# Patient Record
Sex: MALE | Race: BLACK OR AFRICAN AMERICAN | HISPANIC OR LATINO | Employment: FULL TIME | URBAN - METROPOLITAN AREA
[De-identification: names, ages, dates, MRNs, and addresses within clinical notes are randomized per-mention and may not be internally consistent; named-entity substitution may affect disease eponyms.]

---

## 2023-05-09 PROBLEM — R53.83 OTHER FATIGUE: Status: ACTIVE | Noted: 2023-05-09

## 2023-05-09 PROBLEM — Z00.00 ANNUAL PHYSICAL EXAM: Status: ACTIVE | Noted: 2023-05-09

## 2023-06-06 ENCOUNTER — ANESTHESIA EVENT (OUTPATIENT)
Dept: ANESTHESIOLOGY | Facility: HOSPITAL | Age: 48
End: 2023-06-06

## 2023-06-06 ENCOUNTER — ANESTHESIA (OUTPATIENT)
Dept: ANESTHESIOLOGY | Facility: HOSPITAL | Age: 48
End: 2023-06-06

## 2023-06-06 NOTE — ANESTHESIA PREPROCEDURE EVALUATION
Procedure:  PRE-OP ONLY    Relevant Problems   CARDIO   (+) CALDERON (dyspnea on exertion)      GI/HEPATIC   (+) Gastroesophageal reflux disease without esophagitis      /RENAL   (+) BREANN (acute kidney injury) ([facility] [address] )      PULMONARY   (+) CALDERON (dyspnea on exertion)        Physical Exam    Airway    Mallampati score: II  TM Distance: >3 FB  Neck ROM: full     Dental   Comment: Multiple caps,     Cardiovascular  Cardiovascular exam normal    Pulmonary  Pulmonary exam normal     Other Findings        Anesthesia Plan  ASA Score- 2     Anesthesia Type- IV sedation with anesthesia with ASA Monitors  Additional Monitors:   Airway Plan:           Plan Factors-Exercise tolerance (METS): >4 METS  Chart reviewed  EKG reviewed  Imaging results reviewed  Existing labs reviewed  Patient summary reviewed  Patient is not a current smoker  Patient not instructed to abstain from smoking on day of procedure  Patient did not smoke on day of surgery  Induction- intravenous  Postoperative Plan-     Informed Consent- Anesthetic plan and risks discussed with patient  I personally reviewed this patient with the CRNA  Discussed and agreed on the Anesthesia Plan with the CRNA  Lindsay Bullock

## 2023-06-07 ENCOUNTER — ANESTHESIA (OUTPATIENT)
Dept: GASTROENTEROLOGY | Facility: AMBULARY SURGERY CENTER | Age: 48
End: 2023-06-07

## 2023-06-07 ENCOUNTER — HOSPITAL ENCOUNTER (OUTPATIENT)
Dept: GASTROENTEROLOGY | Facility: AMBULARY SURGERY CENTER | Age: 48
Setting detail: OUTPATIENT SURGERY
Discharge: HOME | End: 2023-06-07
Attending: INTERNAL MEDICINE
Payer: COMMERCIAL

## 2023-06-07 ENCOUNTER — ANESTHESIA EVENT (OUTPATIENT)
Dept: GASTROENTEROLOGY | Facility: AMBULARY SURGERY CENTER | Age: 48
End: 2023-06-07

## 2023-06-07 VITALS
DIASTOLIC BLOOD PRESSURE: 58 MMHG | SYSTOLIC BLOOD PRESSURE: 115 MMHG | TEMPERATURE: 98.4 F | BODY MASS INDEX: 20.72 KG/M2 | OXYGEN SATURATION: 100 % | RESPIRATION RATE: 20 BRPM | HEART RATE: 63 BPM | WEIGHT: 148 LBS | HEIGHT: 71 IN

## 2023-06-07 DIAGNOSIS — Z12.11 SCREENING FOR COLON CANCER: ICD-10-CM

## 2023-06-07 PROCEDURE — 88305 TISSUE EXAM BY PATHOLOGIST: CPT | Performed by: SPECIALIST

## 2023-06-07 RX ORDER — SODIUM CHLORIDE, SODIUM LACTATE, POTASSIUM CHLORIDE, CALCIUM CHLORIDE 600; 310; 30; 20 MG/100ML; MG/100ML; MG/100ML; MG/100ML
INJECTION, SOLUTION INTRAVENOUS CONTINUOUS PRN
Status: DISCONTINUED | OUTPATIENT
Start: 2023-06-07 | End: 2023-06-07

## 2023-06-07 RX ORDER — PROPOFOL 10 MG/ML
INJECTION, EMULSION INTRAVENOUS AS NEEDED
Status: DISCONTINUED | OUTPATIENT
Start: 2023-06-07 | End: 2023-06-07

## 2023-06-07 RX ADMIN — PROPOFOL 50 MG: 10 INJECTION, EMULSION INTRAVENOUS at 10:05

## 2023-06-07 RX ADMIN — PROPOFOL 50 MG: 10 INJECTION, EMULSION INTRAVENOUS at 10:13

## 2023-06-07 RX ADMIN — PROPOFOL 50 MG: 10 INJECTION, EMULSION INTRAVENOUS at 10:19

## 2023-06-07 RX ADMIN — PROPOFOL 50 MG: 10 INJECTION, EMULSION INTRAVENOUS at 10:10

## 2023-06-07 RX ADMIN — PROPOFOL 150 MG: 10 INJECTION, EMULSION INTRAVENOUS at 10:02

## 2023-06-07 RX ADMIN — PROPOFOL 50 MG: 10 INJECTION, EMULSION INTRAVENOUS at 10:16

## 2023-06-07 RX ADMIN — SODIUM CHLORIDE, SODIUM LACTATE, POTASSIUM CHLORIDE, AND CALCIUM CHLORIDE: .6; .31; .03; .02 INJECTION, SOLUTION INTRAVENOUS at 09:54

## 2023-06-07 RX ADMIN — PROPOFOL 50 MG: 10 INJECTION, EMULSION INTRAVENOUS at 10:08

## 2023-06-07 RX ADMIN — Medication 40 MG: at 10:09

## 2023-06-07 NOTE — ANESTHESIA POSTPROCEDURE EVALUATION
Post-Op Assessment Note    CV Status:  Stable  Pain Score: 0    Pain management: adequate     Mental Status:  Arousable and sleepy   Hydration Status:  Stable   PONV Controlled:  Controlled   Airway Patency:  Patent      Post Op Vitals Reviewed: Yes      Staff: CRNA         No notable events documented      BP   94/63   Temp 97 6   Pulse 59   Resp 14   SpO2 99

## 2023-06-07 NOTE — H&P
"History and Physical -  [facility]  Dante Gordon 51 y o  male MRN: [id number]    HPI: Dante Gordon is a 51y o  year old male who presents for screening colonoscopy      Review of Systems    Historical Information   History reviewed  No pertinent past medical history    Past Surgical History:   Procedure Laterality Date   • FOOT SURGERY     • [place] CORRJ HALLUX VALGUS W/SESMDC W/2 OSTEOT Right 10/12/2022    Procedure: BUNIONECTOMY (KIM) ROSS OSTEOTOMY 2ND METATARSAL OSTEOTOMY, REPAIR 2ND DIGIT HAMMERTOE;  Surgeon: Jose Enrique Muhammad DPM;  Location:  MAIN OR;  Service: Podiatry     Social History   Social History     Substance and Sexual Activity   Alcohol Use Yes   • Alcohol/week: 2 0 standard drinks of alcohol   • Types: 2 Cans of beer per week    Comment: social     Social History     Substance and Sexual Activity   Drug Use Yes   • Frequency: 7 0 times per week   • Types: Marijuana    Comment: 8 blunts daily      Social History     Tobacco Use   Smoking Status Every Day   • Packs/day: 0 50   • Years: 35 00   • Total pack years: 17 50   • Types: Cigarettes   • Start date: 1985   Smokeless Tobacco Never     Family History   Problem Relation Age of Onset   • Hypertension Mother    • Mental illness Father    • Asthma Daughter    • Throat cancer Maternal Uncle    • Heart disease Maternal Grandmother        Meds/Allergies     (Not in a hospital admission)      No Known Allergies    Objective     /69   Pulse 63   Temp 97 9 °F (36 6 °C) (Temporal)   Resp 18   Ht 5' 11\" (1 803 m)   Wt 67 1 kg (148 lb)   SpO2 99%   BMI 20 64 kg/m²       PHYSICAL EXAM    Gen: NAD  CV: RRR  CHEST: Clear  ABD: soft, NT/ND  EXT: no edema  Neuro: AAO      ASSESSMENT/PLAN:  This is a 51y o  year old male here for screening colonoscopy    PLAN:   Procedure: Colonoscopy with biopsy and possible polypectomy    "

## 2023-06-07 NOTE — ANESTHESIA PREPROCEDURE EVALUATION
"Procedure:  COLONOSCOPY     ECHO (01/24/22): Interpretation Summary  •  Left Ventricle: Left ventricular cavity size is normal  The left ventricular ejection fraction is 60%  Systolic function is normal  Wall motion is normal  Diastolic function is normal   •  The left ventricular wall motion is normal     Relevant Problems   ANESTHESIA (within normal limits)      CARDIO   (+) CALDERON (dyspnea on exertion)      ENDO (within normal limits)      GI/HEPATIC   (+) Gastroesophageal reflux disease without esophagitis      /RENAL   (+) BREANN (acute kidney injury) ([facility] [address] )      GYN (within normal limits)      HEMATOLOGY (within normal limits)      MUSCULOSKELETAL (within normal limits)      NEURO/PSYCH (within normal limits)      PULMONARY   (+) CALDERON (dyspnea on exertion)      Other   (+) Tobacco dependence      Lab Results   Component Value Date    HCT 39 3 05/26/2023    HGB 12 8 05/26/2023    MCV 89 05/26/2023     05/26/2023    WBC 12 47 (H) 05/26/2023     Lab Results   Component Value Date    AGAP 5 05/26/2023    ALKPHOS 84 05/26/2023    ALT 11 05/26/2023    AST 19 05/26/2023    BUN 11 05/26/2023    CALCIUM 9 2 05/26/2023     05/26/2023    CO2 29 05/26/2023    CREATININE 1 10 05/26/2023    EGFR 78 05/26/2023    GLUC 96 10/05/2022    GLUF 48 (L) 05/26/2023    K 4 4 05/26/2023    SODIUM 139 05/26/2023    TBILI 0 59 05/26/2023    TP 7 4 05/26/2023     No results found for: \"PTT\"  No results found for: \"INR\", \"PROTIME\"    Physical Exam    Airway    Mallampati score: I  TM Distance: >3 FB  Neck ROM: full     Dental   No notable dental hx     Cardiovascular  Rhythm: regular, Rate: normal, Cardiovascular exam normal    Pulmonary  Pulmonary exam normal Breath sounds clear to auscultation,     Other Findings        Anesthesia Plan  ASA Score- 2     Anesthesia Type- IV sedation with anesthesia with ASA Monitors  Additional Monitors:   Airway Plan:           Plan Factors-Exercise tolerance (METS): >4 METS      Chart " reviewed  EKG reviewed  Imaging results reviewed  Existing labs reviewed  Patient summary reviewed  Patient is a current smoker  Patient did not smoke on day of surgery  Obstructive sleep apnea risk education given perioperatively  Induction- intravenous  Postoperative Plan-     Informed Consent- Anesthetic plan and risks discussed with patient  I personally reviewed this patient with the CRNA  Discussed and agreed on the Anesthesia Plan with the KEVAN Morrissey

## 2023-06-23 DIAGNOSIS — N52.9 ERECTILE DYSFUNCTION, UNSPECIFIED ERECTILE DYSFUNCTION TYPE: ICD-10-CM

## 2023-06-23 RX ORDER — SILDENAFIL 100 MG/1
100 TABLET, FILM COATED ORAL AS NEEDED
Qty: 30 TABLET | Refills: 2 | Status: SHIPPED | OUTPATIENT
Start: 2023-06-23

## 2023-07-08 PROBLEM — Z12.5 PROSTATE CANCER SCREENING: Status: RESOLVED | Noted: 2023-05-09 | Resolved: 2023-07-08

## 2023-09-11 DIAGNOSIS — N52.9 ERECTILE DYSFUNCTION, UNSPECIFIED ERECTILE DYSFUNCTION TYPE: ICD-10-CM

## 2023-09-11 RX ORDER — SILDENAFIL 100 MG/1
100 TABLET, FILM COATED ORAL AS NEEDED
Qty: 30 TABLET | Refills: 2 | OUTPATIENT
Start: 2023-09-11

## 2023-09-11 RX ORDER — SILDENAFIL 100 MG/1
100 TABLET, FILM COATED ORAL AS NEEDED
Qty: 30 TABLET | Refills: 0 | Status: SHIPPED | OUTPATIENT
Start: 2023-09-11

## 2023-10-05 DIAGNOSIS — N52.9 ERECTILE DYSFUNCTION, UNSPECIFIED ERECTILE DYSFUNCTION TYPE: ICD-10-CM

## 2023-10-09 RX ORDER — SILDENAFIL 100 MG/1
100 TABLET, FILM COATED ORAL AS NEEDED
Qty: 30 TABLET | Refills: 0 | Status: SHIPPED | OUTPATIENT
Start: 2023-10-09

## 2023-11-05 DIAGNOSIS — N52.9 ERECTILE DYSFUNCTION, UNSPECIFIED ERECTILE DYSFUNCTION TYPE: ICD-10-CM

## 2023-11-07 RX ORDER — SILDENAFIL 100 MG/1
TABLET, FILM COATED ORAL
Qty: 30 TABLET | Refills: 0 | Status: SHIPPED | OUTPATIENT
Start: 2023-11-07

## 2023-11-27 ENCOUNTER — OFFICE VISIT (OUTPATIENT)
Dept: FAMILY MEDICINE CLINIC | Facility: CLINIC | Age: 48
End: 2023-11-27
Payer: COMMERCIAL

## 2023-11-27 VITALS
HEART RATE: 66 BPM | RESPIRATION RATE: 15 BRPM | DIASTOLIC BLOOD PRESSURE: 84 MMHG | OXYGEN SATURATION: 100 % | TEMPERATURE: 96.9 F | WEIGHT: 154.8 LBS | HEIGHT: 71 IN | BODY MASS INDEX: 21.67 KG/M2 | SYSTOLIC BLOOD PRESSURE: 120 MMHG

## 2023-11-27 DIAGNOSIS — N52.9 ERECTILE DYSFUNCTION, UNSPECIFIED ERECTILE DYSFUNCTION TYPE: ICD-10-CM

## 2023-11-27 DIAGNOSIS — M25.461 PREPATELLAR EFFUSION OF RIGHT KNEE: Primary | ICD-10-CM

## 2023-11-27 PROCEDURE — 99213 OFFICE O/P EST LOW 20 MIN: CPT

## 2023-11-27 RX ORDER — SILDENAFIL 100 MG/1
100 TABLET, FILM COATED ORAL AS NEEDED
Qty: 30 TABLET | Refills: 0 | Status: SHIPPED | OUTPATIENT
Start: 2023-11-27

## 2023-11-27 NOTE — ASSESSMENT & PLAN NOTE
Focal swelling lying anterior R patella with moderate tenderness. No known trauma. No fevers or rashes. No warmth or redness to knee.  Previously has occurred to L knee which required drainage.  - Trial OTC naproxen, 1 tablet taken BID, assess effectiveness of antiinflammatory  - Referral to sports medicine for local imaging and consideration for drainage

## 2023-11-27 NOTE — ASSESSMENT & PLAN NOTE
Medication refill. Pt denies hx palpitations, chest pain, AM headaches, or priapism. Takes 1 100mg tablet only.

## 2023-11-27 NOTE — PROGRESS NOTES
Name: Neeru Andrade      : 1975      MRN: [id number]  Encounter Provider: Dorian Ruiz MD  Encounter Date: 2023   Encounter department: [facility]    Assessment & Plan     1. Prepatellar effusion of right knee  Assessment & Plan:  Focal swelling lying anterior R patella with moderate tenderness. No known trauma. No fevers or rashes. No warmth or redness to knee. Previously has occurred to L knee which required drainage.  - Trial OTC naproxen, 1 tablet taken BID, assess effectiveness of antiinflammatory  - Referral to sports medicine for local imaging and consideration for drainage    Orders:  -     Ambulatory Referral to Sports Medicine; Future    2. Erectile dysfunction, unspecified erectile dysfunction type  Assessment & Plan:  Medication refill. Pt denies hx palpitations, chest pain, AM headaches, or priapism. Takes 1 100mg tablet only. Orders:  -     sildenafil (VIAGRA) 100 mg tablet; Take 1 tablet (100 mg total) by mouth as needed for erectile dysfunction           Subjective      HPI  Pt presents with R knee swelling. Hx L knee effusion requiring drainage at the hospital, did not need abx afterward. No fluid analysis available in chart. R knee swollen x1w. No notable injuries. On feet a lot at work. Moderately painful and significantly swollen. Hurts more when ambulating, relieved by laying down. Diet without much red meat, without much beer. Fhx: grandmother had osteoarthritis  No rashes, no fevers, no discomfort in other joints. Review of Systems   Constitutional:  Negative for chills and fever. HENT:  Negative for ear pain and sore throat. Eyes:  Negative for pain and visual disturbance. Respiratory:  Negative for cough and shortness of breath. Cardiovascular:  Negative for chest pain and palpitations. Gastrointestinal:  Negative for abdominal pain and vomiting. Genitourinary:  Negative for dysuria and hematuria. Musculoskeletal:  Positive for joint swelling. Negative for back pain. Skin:  Negative for color change and rash. Neurological:  Negative for seizures and syncope. Current Outpatient Medications on File Prior to Visit   Medication Sig    gabapentin (NEURONTIN) 100 mg capsule     [DISCONTINUED] sildenafil (VIAGRA) 100 mg tablet TAKE 1 TABLET BY MOUTH AS NEEDED FOR ERECTILE DYSFUNCTION FOR UP TO 10 DOSES    buPROPion (WELLBUTRIN XL) 150 mg 24 hr tablet Take 1 tablet (150 mg total) by mouth every morning    ibuprofen (MOTRIN) 600 mg tablet  (Patient not taking: Reported on 5/9/2023)    nicotine (NICODERM CQ) 14 mg/24hr TD 24 hr patch Place 1 patch on the skin every 24 hours (Patient not taking: Reported on 11/27/2023)       Objective     /84 (BP Location: Right arm, Patient Position: Sitting, Cuff Size: Adult)   Pulse 66   Temp (!) 96.9 °F (36.1 °C) (Tympanic)   Resp 15   Ht 5' 11" (1.803 m)   Wt 70.2 kg (154 lb 12.8 oz)   SpO2 100%   BMI 21.59 kg/m²     Physical Exam  Constitutional:       General: He is not in acute distress. HENT:      Head: Normocephalic and atraumatic. Nose: Nose normal. No congestion or rhinorrhea. Mouth/Throat:      Mouth: Mucous membranes are moist.      Pharynx: No oropharyngeal exudate or posterior oropharyngeal erythema. Eyes:      General:         Right eye: No discharge. Left eye: No discharge. Conjunctiva/sclera: Conjunctivae normal.   Cardiovascular:      Rate and Rhythm: Normal rate and regular rhythm. Pulmonary:      Effort: Pulmonary effort is normal. No respiratory distress. Abdominal:      General: Abdomen is flat. There is no distension. Musculoskeletal:         General: Swelling (focal area lying anterior R patella, no notable edema to knee joint) and tenderness (overlying area of edema) present. No signs of injury. Cervical back: Neck supple. Right lower leg: No edema. Left lower leg: No edema.       Comments: Full strength and ROM of R knee. No joint line tenderness, no crepitus, no overlying erythema or warmth   Lymphadenopathy:      Cervical: No cervical adenopathy. Skin:     General: Skin is dry. Coloration: Skin is not jaundiced. Neurological:      General: No focal deficit present. Mental Status: He is alert and oriented to person, place, and time. Psychiatric:         Mood and Affect: Mood normal.         Thought Content:  Thought content normal.       Ann Norton MD

## 2023-11-28 ENCOUNTER — TELEPHONE (OUTPATIENT)
Age: 48
End: 2023-11-28

## 2023-11-28 NOTE — TELEPHONE ENCOUNTER
Caller: Patient     Doctor: Pelon Yusuf    Reason for call: Patient called to schedule from referral for Dr. Pelon Yusuf, but was on break and had to get back to work. Patient stated he will call back later.     Call back#: n/a

## 2023-12-28 DIAGNOSIS — N52.9 ERECTILE DYSFUNCTION, UNSPECIFIED ERECTILE DYSFUNCTION TYPE: ICD-10-CM

## 2023-12-28 RX ORDER — SILDENAFIL 100 MG/1
100 TABLET, FILM COATED ORAL AS NEEDED
Qty: 30 TABLET | Refills: 0 | Status: SHIPPED | OUTPATIENT
Start: 2023-12-28

## 2024-02-12 DIAGNOSIS — N52.9 ERECTILE DYSFUNCTION, UNSPECIFIED ERECTILE DYSFUNCTION TYPE: ICD-10-CM

## 2024-02-12 RX ORDER — SILDENAFIL 100 MG/1
100 TABLET, FILM COATED ORAL AS NEEDED
Qty: 30 TABLET | Refills: 0 | Status: SHIPPED | OUTPATIENT
Start: 2024-02-12

## 2024-02-26 DIAGNOSIS — N52.9 ERECTILE DYSFUNCTION, UNSPECIFIED ERECTILE DYSFUNCTION TYPE: ICD-10-CM

## 2024-02-27 RX ORDER — SILDENAFIL 100 MG/1
100 TABLET, FILM COATED ORAL AS NEEDED
Qty: 30 TABLET | Refills: 0 | Status: SHIPPED | OUTPATIENT
Start: 2024-02-27

## 2024-03-25 DIAGNOSIS — N52.9 ERECTILE DYSFUNCTION, UNSPECIFIED ERECTILE DYSFUNCTION TYPE: ICD-10-CM

## 2024-03-28 RX ORDER — SILDENAFIL 100 MG/1
100 TABLET, FILM COATED ORAL AS NEEDED
Qty: 30 TABLET | Refills: 0 | Status: SHIPPED | OUTPATIENT
Start: 2024-03-28

## 2024-04-15 DIAGNOSIS — N52.9 ERECTILE DYSFUNCTION, UNSPECIFIED ERECTILE DYSFUNCTION TYPE: ICD-10-CM

## 2024-04-15 RX ORDER — SILDENAFIL 100 MG/1
100 TABLET, FILM COATED ORAL AS NEEDED
Qty: 30 TABLET | Refills: 0 | Status: SHIPPED | OUTPATIENT
Start: 2024-04-15

## 2024-05-02 DIAGNOSIS — N52.9 ERECTILE DYSFUNCTION, UNSPECIFIED ERECTILE DYSFUNCTION TYPE: ICD-10-CM

## 2024-05-03 RX ORDER — SILDENAFIL 100 MG/1
100 TABLET, FILM COATED ORAL AS NEEDED
Qty: 30 TABLET | Refills: 5 | Status: SHIPPED | OUTPATIENT
Start: 2024-05-03

## 2024-05-20 DIAGNOSIS — N52.9 ERECTILE DYSFUNCTION, UNSPECIFIED ERECTILE DYSFUNCTION TYPE: ICD-10-CM

## 2024-05-20 RX ORDER — SILDENAFIL 100 MG/1
100 TABLET, FILM COATED ORAL AS NEEDED
Qty: 30 TABLET | Refills: 5 | Status: SHIPPED | OUTPATIENT
Start: 2024-05-20

## 2024-06-03 DIAGNOSIS — N52.9 ERECTILE DYSFUNCTION, UNSPECIFIED ERECTILE DYSFUNCTION TYPE: ICD-10-CM

## 2024-06-03 RX ORDER — SILDENAFIL 100 MG/1
100 TABLET, FILM COATED ORAL AS NEEDED
Qty: 30 TABLET | Refills: 0 | Status: SHIPPED | OUTPATIENT
Start: 2024-06-03

## 2024-06-16 ENCOUNTER — HOSPITAL ENCOUNTER (EMERGENCY)
Facility: HOSPITAL | Age: 49
Discharge: HOME | End: 2024-06-16
Attending: EMERGENCY MEDICINE | Admitting: EMERGENCY MEDICINE
Payer: COMMERCIAL

## 2024-06-16 VITALS
TEMPERATURE: 98.1 F | OXYGEN SATURATION: 98 % | RESPIRATION RATE: 18 BRPM | HEART RATE: 78 BPM | SYSTOLIC BLOOD PRESSURE: 148 MMHG | DIASTOLIC BLOOD PRESSURE: 84 MMHG

## 2024-06-16 DIAGNOSIS — M54.50 ACUTE BILATERAL LOW BACK PAIN WITHOUT SCIATICA: Primary | ICD-10-CM

## 2024-06-16 LAB
BACTERIA UR QL AUTO: ABNORMAL /HPF
BILIRUB UR QL STRIP: NEGATIVE
CLARITY UR: CLEAR
COLOR UR: YELLOW
GLUCOSE UR STRIP-MCNC: NEGATIVE MG/DL
HGB UR QL STRIP.AUTO: NEGATIVE
KETONES UR STRIP-MCNC: NEGATIVE MG/DL
LEUKOCYTE ESTERASE UR QL STRIP: ABNORMAL
MUCOUS THREADS UR QL AUTO: ABNORMAL
NITRITE UR QL STRIP: NEGATIVE
NON-SQ EPI CELLS URNS QL MICRO: ABNORMAL /HPF
PH UR STRIP.AUTO: 6.5 [PH]
PROT UR STRIP-MCNC: NEGATIVE MG/DL
RBC #/AREA URNS AUTO: ABNORMAL /HPF
SP GR UR STRIP.AUTO: 1.02 (ref 1–1.03)
UROBILINOGEN UR QL STRIP.AUTO: 0.2 E.U./DL
WBC #/AREA URNS AUTO: ABNORMAL /HPF

## 2024-06-16 PROCEDURE — 81001 URINALYSIS AUTO W/SCOPE: CPT | Performed by: EMERGENCY MEDICINE

## 2024-06-16 PROCEDURE — 99283 EMERGENCY DEPT VISIT LOW MDM: CPT

## 2024-06-16 PROCEDURE — 96372 THER/PROPH/DIAG INJ SC/IM: CPT

## 2024-06-16 PROCEDURE — 99284 EMERGENCY DEPT VISIT MOD MDM: CPT | Performed by: EMERGENCY MEDICINE

## 2024-06-16 RX ORDER — IBUPROFEN 600 MG/1
600 TABLET ORAL EVERY 6 HOURS PRN
Qty: 30 TABLET | Refills: 0 | Status: SHIPPED | OUTPATIENT
Start: 2024-06-16

## 2024-06-16 RX ORDER — KETOROLAC TROMETHAMINE 30 MG/ML
30 INJECTION, SOLUTION INTRAMUSCULAR; INTRAVENOUS ONCE
Status: COMPLETED | OUTPATIENT
Start: 2024-06-16 | End: 2024-06-16

## 2024-06-16 RX ORDER — METHOCARBAMOL 500 MG/1
500 TABLET, FILM COATED ORAL 2 TIMES DAILY
Qty: 20 TABLET | Refills: 0 | Status: SHIPPED | OUTPATIENT
Start: 2024-06-16

## 2024-06-16 RX ORDER — METHOCARBAMOL 500 MG/1
500 TABLET, FILM COATED ORAL ONCE
Status: COMPLETED | OUTPATIENT
Start: 2024-06-16 | End: 2024-06-16

## 2024-06-16 RX ORDER — LIDOCAINE 50 MG/G
1 PATCH TOPICAL ONCE
Status: DISCONTINUED | OUTPATIENT
Start: 2024-06-16 | End: 2024-06-16 | Stop reason: HOSPADM

## 2024-06-16 RX ADMIN — LIDOCAINE 1 PATCH: 700 PATCH TOPICAL at 09:44

## 2024-06-16 RX ADMIN — METHOCARBAMOL 500 MG: 500 TABLET ORAL at 09:44

## 2024-06-16 RX ADMIN — KETOROLAC TROMETHAMINE 30 MG: 30 INJECTION, SOLUTION INTRAMUSCULAR at 09:44

## 2024-06-16 NOTE — Clinical Note
Dimitri Patel was seen and treated in our emergency department on 6/16/2024.    No restrictions            Diagnosis: Back pain    Dimitri  may return to work on return date.    He may return on this date: 06/18/2024         If you have any questions or concerns, please don't hesitate to call.      Kar Birmingham MD    ______________________________           _______________          _______________  Hospital Representative                              Date                                Time

## 2024-06-16 NOTE — ED PROVIDER NOTES
History  Chief Complaint   Patient presents with    Back Pain     Pt reports midline back pain radiating to lower back x 2 days, pt lifts heavy objects for work but denies known injury/trauma to area. No meds PTA     49-year-old male presents to the emergency department for evaluation of back pain.  Patient reports worsening back pain over the past 2 weeks.  He states that he has to lift heavy objects at work and believes his pain is related to this.  Denies any recent falls or direct trauma to the area.  States pain is worse with movement and with bending.  He has not been taking any medications to treat his symptoms. Denies fevers, chills, sweats. No saddle anesthesia. Full strength and sensation bilateral lower extremities. No loss of bowel or bladder function. Denies IV drug use. Denies history of cancer.  No unexpected weight loss. No long term steroid use. No pulsatile abdominal mass. No hematuria. No HIV, Transplant or systemic corticosteroids. No midline tenderness.             Prior to Admission Medications   Prescriptions Last Dose Informant Patient Reported? Taking?   buPROPion (WELLBUTRIN XL) 150 mg 24 hr tablet   No No   Sig: Take 1 tablet (150 mg total) by mouth every morning   gabapentin (NEURONTIN) 100 mg capsule   Yes No   ibuprofen (MOTRIN) 600 mg tablet   Yes No   Patient not taking: Reported on 5/9/2023   nicotine (NICODERM CQ) 14 mg/24hr TD 24 hr patch   No No   Sig: Place 1 patch on the skin every 24 hours   Patient not taking: Reported on 11/27/2023   sildenafil (VIAGRA) 100 mg tablet   No No   Sig: Take 1 tablet (100 mg total) by mouth as needed for erectile dysfunction      Facility-Administered Medications: None       History reviewed. No pertinent past medical history.    Past Surgical History:   Procedure Laterality Date    FOOT SURGERY      [place] CORRJ HLX VLGS BNCTY [facility] W/DOUBLE OSTEOTOMY Right 10/12/2022    Procedure: BUNIONECTOMY (KIM) ROSS OSTEOTOMY 2ND METATARSAL OSTEOTOMY,  REPAIR 2ND DIGIT HAMMERTOE;  Surgeon: Nikolay Max DPM;  Location:  MAIN OR;  Service: Podiatry       Family History   Problem Relation Age of Onset    Hypertension Mother     Mental illness Father     Asthma Daughter     Throat cancer Maternal Uncle     Heart disease Maternal Grandmother      I have reviewed and agree with the history as documented.    E-Cigarette/Vaping    E-Cigarette Use Never User      E-Cigarette/Vaping Substances    Nicotine No     THC No     CBD No     Flavoring No     Other No     Unknown No      Social History     Tobacco Use    Smoking status: Every Day     Current packs/day: 0.50     Average packs/day: 0.5 packs/day for 39.5 years (19.7 ttl pk-yrs)     Types: Cigarettes     Start date: 1985    Smokeless tobacco: Never   Vaping Use    Vaping status: Never Used   Substance Use Topics    Alcohol use: Yes     Alcohol/week: 2.0 standard drinks of alcohol     Types: 2 Cans of beer per week     Comment: social    Drug use: Yes     Frequency: 7.0 times per week     Types: Marijuana     Comment: 8 blunts daily        Review of Systems   Constitutional:  Negative for chills and fever.   HENT:  Negative for ear pain and sore throat.    Eyes:  Negative for pain and visual disturbance.   Respiratory:  Negative for cough and shortness of breath.    Cardiovascular:  Negative for chest pain and palpitations.   Gastrointestinal:  Negative for abdominal pain and vomiting.   Genitourinary:  Negative for dysuria and hematuria.   Musculoskeletal:  Positive for back pain. Negative for arthralgias.   Skin:  Negative for color change and rash.   Neurological:  Negative for seizures and syncope.   All other systems reviewed and are negative.      Physical Exam  Physical Exam  Vitals and nursing note reviewed.   Constitutional:       General: He is not in acute distress.     Appearance: He is well-developed.   HENT:      Head: Normocephalic and atraumatic.   Eyes:      Conjunctiva/sclera: Conjunctivae normal.    Cardiovascular:      Rate and Rhythm: Normal rate and regular rhythm.      Pulses:           Dorsalis pedis pulses are 2+ on the right side and 2+ on the left side.      Heart sounds: No murmur heard.  Pulmonary:      Effort: Pulmonary effort is normal. No respiratory distress.      Breath sounds: Normal breath sounds.   Abdominal:      Palpations: Abdomen is soft.      Tenderness: There is no abdominal tenderness.   Musculoskeletal:         General: Tenderness present. No swelling.      Cervical back: Normal and neck supple.      Thoracic back: Normal.      Lumbar back: Spasms and tenderness present. No bony tenderness. Negative right straight leg raise test and negative left straight leg raise test.        Back:       Right lower leg: No edema.      Left lower leg: No edema.      Comments: Distal lower extremity pulse, motor and sensation intact and symmetric bilaterally   Skin:     General: Skin is warm and dry.      Capillary Refill: Capillary refill takes less than 2 seconds.   Neurological:      Mental Status: He is alert.      Sensory: Sensation is intact.      Motor: Motor function is intact.      Gait: Gait is intact.   Psychiatric:         Mood and Affect: Mood normal.         Vital Signs  ED Triage Vitals   Temperature Pulse Respirations Blood Pressure SpO2   06/16/24 0932 06/16/24 0932 06/16/24 0932 06/16/24 0932 06/16/24 0932   98.1 °F (36.7 °C) 78 18 148/84 98 %      Temp Source Heart Rate Source Patient Position - Orthostatic VS BP Location FiO2 (%)   06/16/24 0932 06/16/24 0932 06/16/24 0932 06/16/24 0932 --   Oral Monitor Sitting Left arm       Pain Score       06/16/24 0944       7           Vitals:    06/16/24 0932   BP: 148/84   Pulse: 78   Patient Position - Orthostatic VS: Sitting         Visual Acuity      ED Medications  Medications   ketorolac (TORADOL) injection 30 mg (30 mg Intramuscular Given 6/16/24 0944)   methocarbamol (ROBAXIN) tablet 500 mg (500 mg Oral Given 6/16/24 0944)        Diagnostic Studies  Results Reviewed       Procedure Component Value Units Date/Time    Urine Microscopic [[id number]]  (Abnormal) Collected: 06/16/24 1036    Lab Status: Final result Specimen: Urine, Clean Catch Updated: 06/16/24 1138     RBC, UA None Seen /hpf      WBC, UA 0-5 /hpf      Epithelial Cells None Seen /hpf      Bacteria, UA Occasional /hpf      MUCUS THREADS Moderate    UA (URINE) with reflex to Scope [[id number]]  (Abnormal) Collected: 06/16/24 1036    Lab Status: Final result Specimen: Urine, Clean Catch Updated: 06/16/24 1043     Color, UA Yellow     Clarity, UA Clear     Specific Gravity, UA 1.020     pH, UA 6.5     Leukocytes, UA Trace     Nitrite, UA Negative     Protein, UA Negative mg/dl      Glucose, UA Negative mg/dl      Ketones, UA Negative mg/dl      Urobilinogen, UA 0.2 E.U./dl      Bilirubin, UA Negative     Occult Blood, UA Negative                   No orders to display              Procedures  Procedures         ED Course  ED Course as of 06/16/24 1543   Sun Jun 16, 2024   1115 Patient reevaluated.  Reports significant improvement of symptoms.                   Medical Decision Making  49-year-old male presented to the emergency department for evaluation of back pain.  On arrival patient was awake, alert and in no acute distress.  Initial vital signs unremarkable.  Physical exam consistent with back spasm.  No red flag signs were present.  Patient treated symptomatically with Toradol, Robaxin and a Lidoderm patch.  On reevaluation patient reported improvement of symptoms.  Urinalysis was not consistent with a urinary tract infection.  No RBCs.  Low suspicion for a kidney stone.  All diagnostic studies were discussed with the patient in detail.  He is appropriate for discharge.  Recommendation was made for the patient to follow-up with his PCP and with comprehensive spine for continued symptoms.  Patient was provided with a prescription for Motrin and Robaxin.  Return precautions  were discussed.    Patient agrees with the plan for discharge and feels comfortable to go home with proper f/u. Advised to return for worsening or additional problems. Diagnostic tests were reviewed and questions answered. Diagnosis, care plan and treatment options were discussed. The patient understands instructions and will follow up as directed.        Amount and/or Complexity of Data Reviewed  Labs: ordered.    Risk  Prescription drug management.             Disposition  Final diagnoses:   Acute bilateral low back pain without sciatica     Time reflects when diagnosis was documented in both MDM as applicable and the Disposition within this note       Time User Action Codes Description Comment    6/16/2024 11:16 AM Kar Birmingham Add [M54.50] Acute bilateral low back pain without sciatica           ED Disposition       ED Disposition   Discharge    Condition   Stable    Date/Time   Sun Jun 16, 2024 10:54 AM    Comment   Dimitri Patel discharge to home/self care.                   Follow-up Information    None         Discharge Medication List as of 6/16/2024 11:18 AM        START taking these medications    Details   methocarbamol (ROBAXIN) 500 mg tablet Take 1 tablet (500 mg total) by mouth 2 (two) times a day, Starting Sun 6/16/2024, Normal           CONTINUE these medications which have CHANGED    Details   ibuprofen (MOTRIN) 600 mg tablet Take 1 tablet (600 mg total) by mouth every 6 (six) hours as needed for mild pain or moderate pain, Starting Sun 6/16/2024, Normal           CONTINUE these medications which have NOT CHANGED    Details   buPROPion (WELLBUTRIN XL) 150 mg 24 hr tablet Take 1 tablet (150 mg total) by mouth every morning, Starting Tue 5/9/2023, Until Sun 11/5/2023, Normal      gabapentin (NEURONTIN) 100 mg capsule Starting Mon 11/7/2022, Historical Med      nicotine (NICODERM CQ) 14 mg/24hr TD 24 hr patch Place 1 patch on the skin every 24 hours, Starting Mon 10/3/2022, Normal       sildenafil (VIAGRA) 100 mg tablet Take 1 tablet (100 mg total) by mouth as needed for erectile dysfunction, Starting Mon 6/3/2024, Normal                 PDMP Review       None            ED Provider  Electronically Signed by             Kar Birmingham MD  06/16/24 3874

## 2024-06-16 NOTE — ED NOTES
Patient unable to provide urine sample at this time. Provided water and encouraged to ring call bell when he is able to provide a sample.      Madiha Patel RN  06/16/24 5428

## 2024-06-17 ENCOUNTER — NURSE TRIAGE (OUTPATIENT)
Dept: PHYSICAL THERAPY | Facility: OTHER | Age: 49
End: 2024-06-17

## 2024-06-17 ENCOUNTER — TELEPHONE (OUTPATIENT)
Age: 49
End: 2024-06-17

## 2024-06-17 DIAGNOSIS — M54.50 ACUTE BILATERAL LOW BACK PAIN WITHOUT SCIATICA: Primary | ICD-10-CM

## 2024-06-17 NOTE — TELEPHONE ENCOUNTER
Additional Information   Negative: Has the patient had unexplained weight loss?   Negative: Does the patient have a fever?   Negative: Is the patient experiencing urine retention?   Negative: Is the patient experiencing acute drop foot or paralysis?   Negative: Has the patient experienced major trauma? (fall from height, high speed collision, direct blow to spine) and is also experiencing nausea, light-headedness, or loss of consciousness?   Negative: Is the patient experiencing blood in sputum?   Negative: Is this a chronic condition?    Protocols used: [facility] Protocol    [facility] was reviewed in detail and what we can provide for their back pain.  Patient is agreeable to being triaged and would like to proceed with Physical Therapy.    Referral was placed for Physical Therapy at the [facility] site. Patients information was sent to the  to make evaluation appointment. Patient made aware that the PT office  will be calling to schedule the appointment.  Patient was provided with the phone number to the PT office.    No further questions and/or concerns were voiced by the patient at this time. Patient states understanding of the referral that was placed.    Referral Closed.

## 2024-06-17 NOTE — TELEPHONE ENCOUNTER
"Additional Information   Negative: Is this related to a work injury?   Negative: Is this related to an MVA?   Negative: Are you currently recieving homecare services?    Background - Initial Assessment  Clinical complaint: ED visit yesterday 06/16 due to Midline Back Pain that started about \"2 weeks ago\", radiating into the lower back, top of both shoulders and trapezius muscle. No pain radiating to his hips or legs. No numbness or tingling. NKI (no w/c or auto) no falls. Patient states he lifts a lot of heavy objects at work that could contribute to his back pain. Patient states pain has been constant for the last 2 weeks. Worse when laying down. Patient described pain as aching, burning, sharp, stabbing, throbbing, dull, shooting.  Date of onset: 2 weeks ago  Frequency of pain: constant  Quality of pain: aching, burning, dull, sharp, shooting, stabbing, and throbbing.    Protocols used: [facility] Protocol    "

## 2024-06-27 DIAGNOSIS — N52.9 ERECTILE DYSFUNCTION, UNSPECIFIED ERECTILE DYSFUNCTION TYPE: ICD-10-CM

## 2024-06-28 RX ORDER — SILDENAFIL 100 MG/1
100 TABLET, FILM COATED ORAL DAILY PRN
Qty: 30 TABLET | Refills: 2 | Status: SHIPPED | OUTPATIENT
Start: 2024-06-28

## 2024-08-09 ENCOUNTER — OFFICE VISIT (OUTPATIENT)
Dept: FAMILY MEDICINE CLINIC | Facility: CLINIC | Age: 49
End: 2024-08-09

## 2024-08-09 VITALS
OXYGEN SATURATION: 97 % | SYSTOLIC BLOOD PRESSURE: 119 MMHG | DIASTOLIC BLOOD PRESSURE: 75 MMHG | TEMPERATURE: 98 F | HEART RATE: 66 BPM | WEIGHT: 151 LBS | HEIGHT: 71 IN | BODY MASS INDEX: 21.14 KG/M2

## 2024-08-09 DIAGNOSIS — M62.838 TRAPEZIUS MUSCLE SPASM: ICD-10-CM

## 2024-08-09 RX ORDER — METHOCARBAMOL 500 MG/1
500 TABLET, FILM COATED ORAL 2 TIMES DAILY
Qty: 20 TABLET | Refills: 0 | Status: SHIPPED | OUTPATIENT
Start: 2024-08-09

## 2024-08-09 NOTE — PROGRESS NOTES
"Ambulatory Visit  Name: Dimitri Patel      : 1975      MRN: [id number]  Encounter Provider: Armando Canseco DO  Encounter Date: 2024   Encounter department: [facility]    Assessment & Plan   1. Trapezius muscle spasm  Assessment & Plan:  Patient's point of pain on examination is consistent with distal lateral portion of trapezius musculature bilaterally more prominent on patient's left.  With no mechanism of acute injury and location of the pain we will proceed with  .  Conservative management.  He will trial 600 mg ibuprofen as needed as well as topical Voltaren gel.  Patient would like to undergo trial of OMT therapy at this time.  Patient explained what OMT is and is willing to give it a try.  Patient is also offered physical therapy which she will hold off on at this time and attempting OMT.  Will follow-up with patient in 1 month after conservative management with OMT and stretching.  If symptoms or not improve can consider imaging pending location and pain.     If pain becomes more diffuse with stiffness can consider rheumatologic workup at this time.  Orders:  -     Diclofenac Sodium (VOLTAREN) 1 %; Apply 4 g topically 4 (four) times a day  -     methocarbamol (ROBAXIN) 500 mg tablet; Take 1 tablet (500 mg total) by mouth 2 (two) times a day      Depression Screening and Follow-up Plan: Patient was screened for depression during today's encounter. They screened negative with a PHQ-2 score of 0.    Tobacco Cessation Counseling: Tobacco cessation counseling was not provided. The patient is sincerely urged to quit consumption of tobacco. He is not ready to quit tobacco.       History of Present Illness     Present with chronic \"back pain and shoulder\" that been ongoing for many months it has gotten progressively worse.  Patient denies any traumatic event or injury.  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  He also " states sometimes upon awaking from sleep he feels more pain as well.  Patient describes the pain as a tightness and stiffness and he points to the posterior side of his shoulder and his neck on his left and right side.  Patient denies any numbness or tingling in his arms.  Patient denies any weakness.  Patient denies any difficulty with  strength.  Patient also denies any anesthesias, bladder incontinence.  Patient tried IcyHot gel which showed moderate improvement.        Review of Systems   Constitutional:  Negative for fatigue.   Respiratory:  Negative for chest tightness and shortness of breath.    Cardiovascular:  Negative for chest pain and palpitations.   Gastrointestinal:  Negative for abdominal pain, blood in stool, diarrhea, nausea and vomiting.   Musculoskeletal:  Positive for back pain and neck pain.   Neurological:  Negative for dizziness, syncope, numbness and headaches.   Psychiatric/Behavioral:  Negative for sleep disturbance and suicidal ideas. The patient is not nervous/anxious.      Pertinent Medical History   Medical History Reviewed by provider this encounter:       Past Medical History   No past medical history on file.  Past Surgical History:   Procedure Laterality Date    FOOT SURGERY      NC CORRJ HLX VLGS BNCTY SESMDC W/DOUBLE OSTEOTOMY Right 10/12/2022    Procedure: BUNIONECTOMY (KIM) ROSS OSTEOTOMY 2ND METATARSAL OSTEOTOMY, REPAIR 2ND DIGIT HAMMERTOE;  Surgeon: Nikolay Max DPM;  Location:  MAIN OR;  Service: Podiatry     Family History   Problem Relation Age of Onset    Hypertension Mother     Mental illness Father     Asthma Daughter     Throat cancer Maternal Uncle     Heart disease Maternal Grandmother      Current Outpatient Medications on File Prior to Visit   Medication Sig Dispense Refill    gabapentin (NEURONTIN) 100 mg capsule       ibuprofen (MOTRIN) 600 mg tablet Take 1 tablet (600 mg total) by mouth every 6 (six) hours as needed for mild pain or moderate pain 30  "tablet 0    sildenafil (VIAGRA) 100 mg tablet TAKE 1 TABLET BY MOUTH AS NEEDED FOR ERECTILE DYSFUNCTION 30 tablet 2    buPROPion (WELLBUTRIN XL) 150 mg 24 hr tablet Take 1 tablet (150 mg total) by mouth every morning 30 tablet 5    nicotine (NICODERM CQ) 14 mg/24hr TD 24 hr patch Place 1 patch on the skin every 24 hours (Patient not taking: Reported on 11/27/2023) 28 patch 0     No current facility-administered medications on file prior to visit.   No Known Allergies   Current Outpatient Medications on File Prior to Visit   Medication Sig Dispense Refill    gabapentin (NEURONTIN) 100 mg capsule       ibuprofen (MOTRIN) 600 mg tablet Take 1 tablet (600 mg total) by mouth every 6 (six) hours as needed for mild pain or moderate pain 30 tablet 0    sildenafil (VIAGRA) 100 mg tablet TAKE 1 TABLET BY MOUTH AS NEEDED FOR ERECTILE DYSFUNCTION 30 tablet 2    buPROPion (WELLBUTRIN XL) 150 mg 24 hr tablet Take 1 tablet (150 mg total) by mouth every morning 30 tablet 5    nicotine (NICODERM CQ) 14 mg/24hr TD 24 hr patch Place 1 patch on the skin every 24 hours (Patient not taking: Reported on 11/27/2023) 28 patch 0     No current facility-administered medications on file prior to visit.      Social History     Tobacco Use    Smoking status: Every Day     Current packs/day: 0.50     Average packs/day: 0.5 packs/day for 39.6 years (19.8 ttl pk-yrs)     Types: Cigarettes     Start date: 1985    Smokeless tobacco: Never   Vaping Use    Vaping status: Never Used   Substance and Sexual Activity    Alcohol use: Yes     Alcohol/week: 2.0 standard drinks of alcohol     Types: 2 Cans of beer per week     Comment: social    Drug use: Yes     Frequency: 7.0 times per week     Types: Marijuana     Comment: 8 blunts daily     Sexual activity: Yes     Partners: Female     Objective     /75 (BP Location: Left arm, Patient Position: Sitting, Cuff Size: Standard)   Pulse 66   Temp 98 °F (36.7 °C) (Tympanic)   Ht 5' 11\" (1.803 m)   Wt " 68.5 kg (151 lb)   SpO2 97%   BMI 21.06 kg/m²     Physical Exam  Constitutional:       Appearance: Normal appearance.   HENT:      Head: Normocephalic and atraumatic.      Right Ear: External ear normal.      Left Ear: External ear normal.      Nose: No congestion or rhinorrhea.   Eyes:      Conjunctiva/sclera: Conjunctivae normal.   Cardiovascular:      Rate and Rhythm: Normal rate and regular rhythm.      Pulses: Normal pulses.      Heart sounds: Normal heart sounds. No murmur heard.  Pulmonary:      Effort: Pulmonary effort is normal. No respiratory distress.      Breath sounds: Normal breath sounds. No stridor. No wheezing or rhonchi.   Abdominal:      General: There is no distension.      Palpations: Abdomen is soft.      Tenderness: There is no abdominal tenderness. There is no guarding.   Musculoskeletal:      Right lower leg: No edema.      Left lower leg: No edema.      Comments: See below detailed msk exam   Neurological:      General: No focal deficit present.      Mental Status: He is alert and oriented to person, place, and time. Mental status is at baseline.      Sensory: No sensory deficit.      Motor: No weakness.      Coordination: Coordination normal.      Gait: Gait normal.   Psychiatric:         Mood and Affect: Mood normal.         Behavior: Behavior normal.         Thought Content: Thought content normal.      Left and Right Shoulder: (same exam bilaterally)      INSPECTION:  Erythema Swelling Ecchymosis Increased warmth   Negative Neg. Neg. Neg.     PALPATION/TENDERNESS:  Acromion Clavicle Scapula/spine of scapula AC joint   + Neg. + Neg.     Subacromial bursa Long head of the biceps Coracoid process Trapezius/periscapular region   Neg. Neg. Neg. +     RANGE OF MOTION:  C-Spine Flexion C-Spine Extension C-Spine Sidebending C-Spine Rotation    intact intact intact intact     Forward Flexion Abduction   intact intact     STRENGTH:  Flexion Abduction Adduction   Intact Intact Intact       Okay  Sign Finger abduction Thumb extension   Intact, bilaterally Intact, bilaterally Intact, bilaterally       ROTATOR CUFF:  Rotator cuff tear  Supraspinatus  Infraspinatus  Subscapularis    (Drop-Arm) (Empty can) (External rotation against resistance) (Belly press)   negative negative negative negative     IMPINGEMENT:  Neer's Salomon-Juan   negative negative     BICEPS TENDINOPATHY:  Resisted forward flexion  Resisted supination   (Speed's) (Yergason's):    Negative  N/a      AC JOINT:  Forced cross body adduction (Scarf cross-arm) Job's AC compression   Negative N/a        Special test:  Spurlings    N/A     Distal Sensation  Radial Pulse   Intact Bilaterally  Present and Equal Bilaterally       Cervical spine:     INSPECTION:  Erythema Swelling Ecchymosis Increased warmth   Neg. Neg. Neg. Neg.     PALPATION/TENDERNESS:  Spinous process cervical spine Cervical paraspinals Trapezius/periscapular region   Negative Neg. Neg.     Acromion Clavicle Scapula/spine of scapula AC joint   Neg. Neg. Neg. Neg.       RANGE OF MOTION:  C-Spine Flexion C-Spine Extension C-Spine Sidebending C-Spine Rotation    intact intact intact intact     Internal rotation in 90 degrees Abduction External rotation in 90 degrees Abduction   Intact Intact     Forward Flexion Abduction   Intact Intact     Okay Sign Finger abduction Thumb extension   Intact, bilaterally Intact, bilaterally Intact, bilaterally     Special test:  Spurlings  William's   Negative  Negative bilaterally     Nerve roots sensation and strength intact bilaterally with no weakness from C5-T1  Special tests:   and release test Finger escape sign   Patient makes a fist and release 20 times and 10 seconds Patient holds fingers extended and abducted, small finger abduction    Negative, without difficulty Negative bilaterally     Distal Sensation  Radial Pulse   Intact Bilaterally  Present and Equal Bilaterally       Administrative Statements   I have spent a total time of  40 minutes in caring for this patient on the day of the visit/encounter including Diagnostic results, Prognosis, Impressions, Documenting in the medical record, Reviewing / ordering tests, medicine, procedures  , Obtaining or reviewing history  , and Communicating with other healthcare professionals .    Armando Canseco,

## 2024-08-11 PROBLEM — M62.838 TRAPEZIUS MUSCLE SPASM: Status: ACTIVE | Noted: 2024-08-11

## 2024-08-11 NOTE — ASSESSMENT & PLAN NOTE
Patient's point of pain on examination is consistent with distal lateral portion of trapezius musculature bilaterally more prominent on patient's left.  With no mechanism of acute injury and location of the pain we will proceed with  .  Conservative management.  He will trial 600 mg ibuprofen as needed as well as topical Voltaren gel.  Patient would like to undergo trial of OMT therapy at this time.  Patient explained what OMT is and is willing to give it a try.  Patient is also offered physical therapy which she will hold off on at this time and attempting OMT.  Will follow-up with patient in 1 month after conservative management with OMT and stretching.  If symptoms or not improve can consider imaging pending location and pain.     If pain becomes more diffuse with stiffness can consider rheumatologic workup at this time.

## 2024-08-19 DIAGNOSIS — N52.9 ERECTILE DYSFUNCTION, UNSPECIFIED ERECTILE DYSFUNCTION TYPE: ICD-10-CM

## 2024-08-19 RX ORDER — SILDENAFIL 100 MG/1
100 TABLET, FILM COATED ORAL DAILY PRN
Qty: 30 TABLET | Refills: 5 | Status: SHIPPED | OUTPATIENT
Start: 2024-08-19

## 2024-08-26 ENCOUNTER — PROCEDURE VISIT (OUTPATIENT)
Dept: FAMILY MEDICINE CLINIC | Facility: CLINIC | Age: 49
End: 2024-08-26

## 2024-08-26 DIAGNOSIS — M99.07 SOMATIC DYSFUNCTION OF UPPER EXTREMITY: ICD-10-CM

## 2024-08-26 DIAGNOSIS — M99.02 SOMATIC DYSFUNCTION OF THORACIC REGION: ICD-10-CM

## 2024-08-26 DIAGNOSIS — M25.511 ACUTE PAIN OF RIGHT SHOULDER: ICD-10-CM

## 2024-08-26 DIAGNOSIS — M99.01 SOMATIC DYSFUNCTION OF CERVICAL REGION: ICD-10-CM

## 2024-08-26 DIAGNOSIS — M62.838 TRAPEZIUS MUSCLE SPASM: Primary | ICD-10-CM

## 2024-08-26 NOTE — PROGRESS NOTES
The Assessment & Plan     This is a 49 y.o. male who presents for OMT follow-up for:  1. Trapezius muscle spasm        2. Acute pain of right shoulder  XR shoulder 2+ vw right      3. Somatic dysfunction of cervical region        4. Somatic dysfunction of thoracic region        5. Somatic dysfunction of upper extremity             1. Patient tolerated OMT well for the above problems,  advised patient to drink fluids and can use NSAID for soreness after treatment     2. OMT Follow up in 2 weeks.    Vineet Patel is a 49 y.o. male and is here for a OMT follow up. The patient reports right and left shoulder and back pain on going for a few months. Patient denies any injury. 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 He also states sometimes upon awaking from sleep he feels more pain as well. Patient describes the pain as a tightness and stiffness and he points to the posterior side of his shoulder and his neck on his left and right side. Patient denies any numbness or tingling in his arms. Patient denies any weakness. Patient denies any difficulty with  strength. Patient also denies any anesthesias, bladder incontinence.      Patient states that the muscle relaxant has helped with his sleep and pain in the night. He states pain today is more in his bilateral ac joint (points to the bone)    Is the patient taking Pain medication? no  robaxin  Has the patient completed physical therapy for this condition? no  Did Patient symptoms improve from last OMT appointment?  This is patients first appointment     The following portions of the patient's history were reviewed and updated as appropriate: allergies, current medications, past family history, past medical history, past social history, past surgical history, and problem list.    Review of Systems  Review of Systems   Musculoskeletal:  Positive for arthralgias and neck pain.   Neurological:  Negative  for dizziness, weakness, numbness and headaches.         Objective     OMT Exam     OMT    Performed by: Armando Canseco DO  Authorized by: Armando Canseco DO  Universal Protocol:  procedure performed by consultantConsent: Verbal consent obtained.  Risks and benefits: risks, benefits and alternatives were discussed  Consent given by: patient  Patient identity confirmed: verbally with patient      Procedure Details:     Region evaluated and treated:  Cervical, Thoracic and Right Extremities    Extremity Information  Extremities: right upper extremity    Thoracic Information  Thoracic Region: T1 - T4  Cervical Details:     Examination Method:  Asymmetry, Misalignment, Crepitation, Defects, Masses, Tissue Texture Change, Stability, Laxity, Effusions, Tone and Tenderness, Pain    Severity:  Moderate    Osteopathic Findings:  Suboccipital boggy musculature.  Paraspinal hypertonicity more prominent on patient's right.  Bilateral spasm of posterior scalenes.  C6 flexed's side bent left rotated left.    Treatment Method:  Soft Tissue Treatment, Myofascial Release Treatment, Muscle Energy Treatment, Facilitated Positional Release Treatment and Counterstrain Treatment    Response:  Improved - The somatic dysfunction is improved but not completely resolved.    Thoracic T1 - T4 details:     Examination Method:  Asymmetry, Misalignment, Crepitation, Defects, Masses and Tissue Texture Change, Stability, Laxity, Effusions, Tone    Severity:  Moderate    Osteopathic Findings:  Bilateral trapezius spasm more prominent distally and laterally.  More prominent on patient's right.  T1-T4 neutral side bent left rotated right.  Right-sided levator scapulae tender point    Treatment Method:  Soft Tissue Treatment, Myofascial Release Treatment, Muscle Energy Treatment, Counterstrain Treatment and Facilitated Positional Release Treatment    Response:  Improved    Right Upper Extremity details:     Examination Method:  Tissue Texture  Change, Stability, Laxity, Effusions, Tone, Asymmetry, Misalignment, Crepitation, Defects, Masses and Range of Motion, Contracture    Severity:  Moderate    Osteopathic Findings:  Restricted in internal rotation and adduction.  John technique completed in all planes.    Treatment Method:  Soft Tissue Treatment, Myofascial Release Treatment, Muscle Energy Treatment, Facilitated Positional Release Treatment and Articulatory Treatment    Response:  Improved - The somatic dysfunction is improved but not completely resolved.    Total Regions Treated:  3  Attending provider present in exam room for procedure: Dahlia Canseco,

## 2024-09-10 DIAGNOSIS — N52.9 ERECTILE DYSFUNCTION, UNSPECIFIED ERECTILE DYSFUNCTION TYPE: ICD-10-CM

## 2024-09-10 RX ORDER — SILDENAFIL 100 MG/1
100 TABLET, FILM COATED ORAL DAILY PRN
Qty: 30 TABLET | Refills: 0 | Status: SHIPPED | OUTPATIENT
Start: 2024-09-10 | End: 2024-09-13

## 2024-09-13 ENCOUNTER — OFFICE VISIT (OUTPATIENT)
Dept: FAMILY MEDICINE CLINIC | Facility: CLINIC | Age: 49
End: 2024-09-13

## 2024-09-13 VITALS
WEIGHT: 146.2 LBS | DIASTOLIC BLOOD PRESSURE: 94 MMHG | HEIGHT: 71 IN | SYSTOLIC BLOOD PRESSURE: 121 MMHG | OXYGEN SATURATION: 97 % | TEMPERATURE: 97.5 F | BODY MASS INDEX: 20.47 KG/M2 | HEART RATE: 70 BPM

## 2024-09-13 DIAGNOSIS — Z12.2 SCREENING FOR LUNG CANCER: ICD-10-CM

## 2024-09-13 DIAGNOSIS — F17.200 TOBACCO DEPENDENCE: ICD-10-CM

## 2024-09-13 DIAGNOSIS — Z12.5 SCREENING FOR PROSTATE CANCER: ICD-10-CM

## 2024-09-13 DIAGNOSIS — Z13.6 SCREENING FOR CARDIOVASCULAR CONDITION: ICD-10-CM

## 2024-09-13 DIAGNOSIS — N52.9 ERECTILE DYSFUNCTION, UNSPECIFIED ERECTILE DYSFUNCTION TYPE: ICD-10-CM

## 2024-09-13 DIAGNOSIS — Z13.1 SCREENING FOR DIABETES MELLITUS: ICD-10-CM

## 2024-09-13 DIAGNOSIS — Z00.00 ANNUAL PHYSICAL EXAM: Primary | ICD-10-CM

## 2024-09-13 RX ORDER — TADALAFIL 5 MG/1
10 TABLET ORAL DAILY PRN
Qty: 30 TABLET | Refills: 1 | Status: SHIPPED | OUTPATIENT
Start: 2024-09-13

## 2024-09-13 NOTE — ASSESSMENT & PLAN NOTE
"Smokes cigarettes since he was \"10 years old\" patient did have a brief 6-month period when he lived in [place] and he was able to quit.  At his most for approximately 10 to 15 years he smoked 2 packs daily.  Patient is currently smoking 1 pack a day.  He has trialed multiple therapies in the past most recently Wellbutrin which did not work and he is no longer taking.  He has NicoDerm patches at home but has never tried dual therapy.  At this time we will trial dual therapy with patches plus lozenges for acute cravings.  Will follow-up within 1 month to see if there is any progress or cutting back.  Patient find strength in his daughter and gives him reason to quit smoking.  Lung cancer screening CT as above.  Orders:    nicotine polacrilex (COMMIT) 4 MG lozenge; Apply 1 lozenge (4 mg total) to the mouth or throat as needed for smoking cessation    "

## 2024-09-13 NOTE — ASSESSMENT & PLAN NOTE
Orders:    tadalafil (CIALIS) 5 MG tablet; Take 2 tablets (10 mg total) by mouth daily as needed for erectile dysfunction

## 2024-09-13 NOTE — PROGRESS NOTES
"Adult Annual Physical  Name: Dimitri Patel      : 1975      MRN: [id number]  Encounter Provider: Armando Canseco DO  Encounter Date: 2024   Encounter department: [facility]    Assessment & Plan  Annual physical exam         Screening for lung cancer  I discussed with him that he is a candidate for lung cancer CT screening.     The following Shared Decision-Making points were covered:  Benefits of screening were discussed, including the rates of reduction in death from lung cancer and other causes.  Harms of screening were reviewed, including false positive tests, radiation exposure levels, risks of invasive procedures, risks of complications of screening, and risk of overdiagnosis.  I counseled on the importance of adherence to annual lung cancer LDCT screening, impact of co-morbidities, and ability or willingness to undergo diagnosis and treatment.  I counseled on the importance of maintaining abstinence as a former smoker or was counseled on the importance of smoking cessation if a current smoker    Review of Eligibility Criteria: He meets all of the criteria for Lung Cancer Screening.   He is 49 y.o.   He has 20 pack year tobacco history and is a current smoker or has quit within the past 15 years  He presents no signs or symptoms of lung cancer    After discussion, the patient decided to elect lung cancer screening.    Orders:    CT lung screening program; Future    Screening for prostate cancer  Normal last year   Orders:    PSA, Total Screen; Future    Screening for diabetes mellitus    Orders:    Comprehensive metabolic panel; Future    Screening for cardiovascular condition    Orders:    Comprehensive metabolic panel; Future    Lipid panel; Future    Tobacco dependence  Smokes cigarettes since he was \"10 years old\" patient did have a brief 6-month period when he lived in [place] and he was able to quit.  At his most for approximately 10 to 15 years he " smoked 2 packs daily.  Patient is currently smoking 1 pack a day.  He has trialed multiple therapies in the past most recently Wellbutrin which did not work and he is no longer taking.  He has NicoDerm patches at home but has never tried dual therapy.  At this time we will trial dual therapy with patches plus lozenges for acute cravings.  Will follow-up within 1 month to see if there is any progress or cutting back.  Patient find strength in his daughter and gives him reason to quit smoking.  Lung cancer screening CT as above.  Orders:    nicotine polacrilex (COMMIT) 4 MG lozenge; Apply 1 lozenge (4 mg total) to the mouth or throat as needed for smoking cessation    Erectile dysfunction, unspecified erectile dysfunction type  Patient is still having difficulty with erectile dysfunction despite taking Viagra 100 mg 30 minutes prior to sexual activity.  Patient states that he is able to get an erection but it does not last the entire day of intercourse.    Patient denies any issues in his relationship, mood disorder, lack of sexual attraction.    At this time we will trial Cialis 10 mg 30 minutes prior to sexual activity to see if this medication improves his sexual experience.  If unsuccessful could consider 5 mg Cialis daily versus urology referral.1 month fu  Orders:    tadalafil (CIALIS) 5 MG tablet; Take 2 tablets (10 mg total) by mouth daily as needed for erectile dysfunction    Immunizations and preventive care screenings were discussed with patient today. Appropriate education was printed on patient's after visit summary.    Discussed risks and benefits of prostate cancer screening. We discussed the controversial history of PSA screening for prostate cancer in the [place] as well as the risk of over detection and over treatment of prostate cancer by way of PSA screening.  The patient understands that PSA blood testing is an imperfect way to screen for prostate cancer and that elevated PSA levels in the  blood may also be caused by infection, inflammation, prostatic trauma or manipulation, urological procedures, or by benign prostatic enlargement.    The role of the digital rectal examination in prostate cancer screening was also discussed and I discussed with him that there is large interobserver variability in the findings of digital rectal examination.    Counseling:  Alcohol/drug use: discussed moderation in alcohol intake, the recommendations for healthy alcohol use, and avoidance of illicit drug use.  Dental Health: discussed importance of regular tooth brushing, flossing, and dental visits.  Injury prevention: discussed safety/seat belts, safety helmets, smoke detectors, carbon dioxide detectors, and smoking near bedding or upholstery.  Sexual health: discussed sexually transmitted diseases, partner selection, use of condoms, avoidance of unintended pregnancy, and contraceptive alternatives.  Exercise: the importance of regular exercise/physical activity was discussed. Recommend exercise 3-5 times per week for at least 30 minutes.       Lung Cancer Screening Shared Decision Making: I discussed with him that he is a candidate for lung cancer CT screening.     The following Shared Decision-Making points were covered:  Benefits of screening were discussed, including the rates of reduction in death from lung cancer and other causes.  Harms of screening were reviewed, including false positive tests, radiation exposure levels, risks of invasive procedures, risks of complications of screening, and risk of overdiagnosis.  I counseled on the importance of adherence to annual lung cancer LDCT screening, impact of co-morbidities, and ability or willingness to undergo diagnosis and treatment.  I counseled on the importance of maintaining abstinence as a former smoker or was counseled on the importance of smoking cessation if a current smoker    Review of Eligibility Criteria: He meets all of the criteria for Lung Cancer  Screening.   - He is 49 y.o.   - He has 20 pack year tobacco history and is a current smoker or has quit within the past 15 years  - He presents no signs or symptoms of lung cancer    After discussion, the patient decided to elect lung cancer screening.        History of Present Illness     Adult Annual Physical:  Patient presents for annual physical.     Diet and Physical Activity:  - Diet/Nutrition: well balanced diet.  - Exercise: 3-4 times a week on average.    General Health:  - Sleep: sleeps well.  - Hearing: normal hearing bilateral ears.  - Vision: no vision problems.  - Dental: regular dental visits. had majority of his teeth pulled due to poor dental care     Health:    - Urinary symptoms: none.     Advanced Care Planning:  - Has an advanced directive?: no    - Has a durable medical POA?: no    - ACP document given to patient?: no      Review of Systems   Constitutional:  Negative for fatigue.   Respiratory:  Negative for chest tightness and shortness of breath.    Cardiovascular:  Negative for chest pain and palpitations.   Gastrointestinal:  Negative for abdominal pain, blood in stool, diarrhea, nausea and vomiting.   Neurological:  Negative for dizziness, syncope, numbness and headaches.   Psychiatric/Behavioral:  Negative for sleep disturbance and suicidal ideas. The patient is not nervous/anxious.      Pertinent Medical History     Medical History Reviewed by provider this encounter:           Medical History Reviewed by provider this encounter:       Past Medical History   No past medical history on file.  Past Surgical History:   Procedure Laterality Date    FOOT SURGERY      [place] CORRJ HLX VLGS BNCTY SESMDC W/DOUBLE OSTEOTOMY Right 10/12/2022    Procedure: BUNIONECTOMY (KIM) ROSS OSTEOTOMY 2ND METATARSAL OSTEOTOMY, REPAIR 2ND DIGIT HAMMERTOE;  Surgeon: Nikolay Max DPM;  Location:  MAIN OR;  Service: Podiatry     Family History   Problem Relation Age of Onset    Hypertension Mother     Mental  illness Father     Asthma Daughter     Throat cancer Maternal Uncle     Heart disease Maternal Grandmother      Current Outpatient Medications on File Prior to Visit   Medication Sig Dispense Refill    Diclofenac Sodium (VOLTAREN) 1 % Apply 4 g topically 4 (four) times a day 100 g 1    methocarbamol (ROBAXIN) 500 mg tablet Take 1 tablet (500 mg total) by mouth 2 (two) times a day (Patient not taking: Reported on 9/13/2024) 20 tablet 0    nicotine (NICODERM CQ) 14 mg/24hr TD 24 hr patch Place 1 patch on the skin every 24 hours (Patient not taking: Reported on 11/27/2023) 28 patch 0    [DISCONTINUED] buPROPion (WELLBUTRIN XL) 150 mg 24 hr tablet Take 1 tablet (150 mg total) by mouth every morning 30 tablet 5    [DISCONTINUED] gabapentin (NEURONTIN) 100 mg capsule  (Patient not taking: Reported on 9/13/2024)      [DISCONTINUED] ibuprofen (MOTRIN) 600 mg tablet Take 1 tablet (600 mg total) by mouth every 6 (six) hours as needed for mild pain or moderate pain (Patient not taking: Reported on 9/13/2024) 30 tablet 0    [DISCONTINUED] sildenafil (VIAGRA) 100 mg tablet Take 1 tablet (100 mg total) by mouth daily as needed for erectile dysfunction (Patient not taking: Reported on 9/13/2024) 30 tablet 0     No current facility-administered medications on file prior to visit.   No Known Allergies   Current Outpatient Medications on File Prior to Visit   Medication Sig Dispense Refill    Diclofenac Sodium (VOLTAREN) 1 % Apply 4 g topically 4 (four) times a day 100 g 1    methocarbamol (ROBAXIN) 500 mg tablet Take 1 tablet (500 mg total) by mouth 2 (two) times a day (Patient not taking: Reported on 9/13/2024) 20 tablet 0    nicotine (NICODERM CQ) 14 mg/24hr TD 24 hr patch Place 1 patch on the skin every 24 hours (Patient not taking: Reported on 11/27/2023) 28 patch 0    [DISCONTINUED] buPROPion (WELLBUTRIN XL) 150 mg 24 hr tablet Take 1 tablet (150 mg total) by mouth every morning 30 tablet 5    [DISCONTINUED] gabapentin  "(NEURONTIN) 100 mg capsule  (Patient not taking: Reported on 9/13/2024)      [DISCONTINUED] ibuprofen (MOTRIN) 600 mg tablet Take 1 tablet (600 mg total) by mouth every 6 (six) hours as needed for mild pain or moderate pain (Patient not taking: Reported on 9/13/2024) 30 tablet 0    [DISCONTINUED] sildenafil (VIAGRA) 100 mg tablet Take 1 tablet (100 mg total) by mouth daily as needed for erectile dysfunction (Patient not taking: Reported on 9/13/2024) 30 tablet 0     No current facility-administered medications on file prior to visit.      Social History     Tobacco Use    Smoking status: Every Day     Current packs/day: 0.50     Average packs/day: 0.5 packs/day for 39.7 years (19.8 ttl pk-yrs)     Types: Cigarettes     Start date: 1985    Smokeless tobacco: Never   Vaping Use    Vaping status: Never Used   Substance and Sexual Activity    Alcohol use: Yes     Alcohol/week: 2.0 standard drinks of alcohol     Types: 2 Cans of beer per week     Comment: social    Drug use: Yes     Frequency: 7.0 times per week     Types: Marijuana     Comment: 8 blunts daily     Sexual activity: Yes     Partners: Female       Objective     /94 (BP Location: Left arm, Patient Position: Sitting, Cuff Size: Standard)   Pulse 70   Temp 97.5 °F (36.4 °C) (Tympanic)   Ht 5' 11\" (1.803 m)   Wt 66.3 kg (146 lb 3.2 oz)   SpO2 97%   BMI 20.39 kg/m²     Physical Exam  Constitutional:       General: He is not in acute distress.     Appearance: Normal appearance. He is not ill-appearing or toxic-appearing.   HENT:      Head: Normocephalic and atraumatic.      Right Ear: Tympanic membrane, ear canal and external ear normal. There is no impacted cerumen.      Left Ear: Tympanic membrane, ear canal and external ear normal. There is no impacted cerumen.      Nose: No congestion or rhinorrhea.      Mouth/Throat:      Pharynx: No oropharyngeal exudate or posterior oropharyngeal erythema.   Eyes:      General:         Right eye: No discharge.  "        Left eye: No discharge.      Conjunctiva/sclera: Conjunctivae normal.   Cardiovascular:      Rate and Rhythm: Normal rate and regular rhythm.      Pulses: Normal pulses.      Heart sounds: Normal heart sounds. No murmur heard.  Pulmonary:      Effort: Pulmonary effort is normal. No respiratory distress.      Breath sounds: Normal breath sounds. No stridor. No wheezing or rhonchi.   Abdominal:      General: There is no distension.      Palpations: Abdomen is soft.      Tenderness: There is no abdominal tenderness. There is no guarding.   Musculoskeletal:      Cervical back: Normal range of motion. No tenderness.      Right lower leg: No edema.      Left lower leg: No edema.   Lymphadenopathy:      Cervical: No cervical adenopathy.   Skin:     General: Skin is warm.      Capillary Refill: Capillary refill takes less than 2 seconds.   Neurological:      General: No focal deficit present.      Mental Status: He is alert and oriented to person, place, and time.   Psychiatric:         Mood and Affect: Mood normal.         Behavior: Behavior normal.         Thought Content: Thought content normal.       Armando Canseco, DO  PGY-2 Family Medicine

## 2024-09-13 NOTE — PROGRESS NOTES
Adult Annual Physical  Name: Dimitri Patel      : 1975      MRN: [id number]  Encounter Provider: Armando Canseco DO  Encounter Date: 2024   Encounter department: [facility]    Assessment & Plan  Annual physical exam         Screening for lung cancer  I discussed with him that he is a candidate for lung cancer CT screening.     The following Shared Decision-Making points were covered:  Benefits of screening were discussed, including the rates of reduction in death from lung cancer and other causes.  Harms of screening were reviewed, including false positive tests, radiation exposure levels, risks of invasive procedures, risks of complications of screening, and risk of overdiagnosis.  I counseled on the importance of adherence to annual lung cancer LDCT screening, impact of co-morbidities, and ability or willingness to undergo diagnosis and treatment.  I counseled on the importance of maintaining abstinence as a former smoker or was counseled on the importance of smoking cessation if a current smoker    Review of Eligibility Criteria: He meets all of the criteria for Lung Cancer Screening.   He is 49 y.o.   He has 20 pack year tobacco history and is a current smoker or has quit within the past 15 years  He presents no signs or symptoms of lung cancer    After discussion, the patient decided to elect lung cancer screening.       Screening for prostate cancer         Screening for diabetes mellitus         Screening for cardiovascular condition         Tobacco dependence         Immunizations and preventive care screenings were discussed with patient today. Appropriate education was printed on patient's after visit summary.    Discussed risks and benefits of prostate cancer screening. We discussed the controversial history of PSA screening for prostate cancer in the [place] as well as the risk of over detection and over treatment of prostate cancer by way of PSA  screening.  The patient understands that PSA blood testing is an imperfect way to screen for prostate cancer and that elevated PSA levels in the blood may also be caused by infection, inflammation, prostatic trauma or manipulation, urological procedures, or by benign prostatic enlargement.    The role of the digital rectal examination in prostate cancer screening was also discussed and I discussed with him that there is large interobserver variability in the findings of digital rectal examination.    Counseling:  Alcohol/drug use: discussed moderation in alcohol intake, the recommendations for healthy alcohol use, and avoidance of illicit drug use.  Dental Health: discussed importance of regular tooth brushing, flossing, and dental visits.  Injury prevention: discussed safety/seat belts, safety helmets, smoke detectors, carbon dioxide detectors, and smoking near bedding or upholstery.  Sexual health: discussed sexually transmitted diseases, partner selection, use of condoms, avoidance of unintended pregnancy, and contraceptive alternatives.  Exercise: the importance of regular exercise/physical activity was discussed. Recommend exercise 3-5 times per week for at least 30 minutes.          History of Present Illness   {Disappearing Hyperlinks I Encounters * My Last Note * Since Last Visit * History :98303}  Adult Annual Physical  Review of Systems  Pertinent Medical History     Medical History Reviewed by provider this encounter:           Medical History Reviewed by provider this encounter:       Past Medical History   No past medical history on file.  Past Surgical History:   Procedure Laterality Date    FOOT SURGERY      [place] CORRJ HLX VLGS BNCTY SESMDC W/DOUBLE OSTEOTOMY Right 10/12/2022    Procedure: BUNIONECTOMY (KIM) ROSS OSTEOTOMY 2ND METATARSAL OSTEOTOMY, REPAIR 2ND DIGIT HAMMERTOE;  Surgeon: Nikolay Max DPM;  Location:  MAIN OR;  Service: Podiatry     Family History   Problem Relation Age of Onset     Hypertension Mother     Mental illness Father     Asthma Daughter     Throat cancer Maternal Uncle     Heart disease Maternal Grandmother      Current Outpatient Medications on File Prior to Visit   Medication Sig Dispense Refill    Diclofenac Sodium (VOLTAREN) 1 % Apply 4 g topically 4 (four) times a day 100 g 1    methocarbamol (ROBAXIN) 500 mg tablet Take 1 tablet (500 mg total) by mouth 2 (two) times a day (Patient not taking: Reported on 9/13/2024) 20 tablet 0    nicotine (NICODERM CQ) 14 mg/24hr TD 24 hr patch Place 1 patch on the skin every 24 hours (Patient not taking: Reported on 11/27/2023) 28 patch 0    [DISCONTINUED] buPROPion (WELLBUTRIN XL) 150 mg 24 hr tablet Take 1 tablet (150 mg total) by mouth every morning 30 tablet 5    [DISCONTINUED] gabapentin (NEURONTIN) 100 mg capsule  (Patient not taking: Reported on 9/13/2024)      [DISCONTINUED] ibuprofen (MOTRIN) 600 mg tablet Take 1 tablet (600 mg total) by mouth every 6 (six) hours as needed for mild pain or moderate pain (Patient not taking: Reported on 9/13/2024) 30 tablet 0    [DISCONTINUED] sildenafil (VIAGRA) 100 mg tablet Take 1 tablet (100 mg total) by mouth daily as needed for erectile dysfunction (Patient not taking: Reported on 9/13/2024) 30 tablet 0     No current facility-administered medications on file prior to visit.   No Known Allergies   Current Outpatient Medications on File Prior to Visit   Medication Sig Dispense Refill    Diclofenac Sodium (VOLTAREN) 1 % Apply 4 g topically 4 (four) times a day 100 g 1    methocarbamol (ROBAXIN) 500 mg tablet Take 1 tablet (500 mg total) by mouth 2 (two) times a day (Patient not taking: Reported on 9/13/2024) 20 tablet 0    nicotine (NICODERM CQ) 14 mg/24hr TD 24 hr patch Place 1 patch on the skin every 24 hours (Patient not taking: Reported on 11/27/2023) 28 patch 0    [DISCONTINUED] buPROPion (WELLBUTRIN XL) 150 mg 24 hr tablet Take 1 tablet (150 mg total) by mouth every morning 30 tablet 5     "[DISCONTINUED] gabapentin (NEURONTIN) 100 mg capsule  (Patient not taking: Reported on 9/13/2024)      [DISCONTINUED] ibuprofen (MOTRIN) 600 mg tablet Take 1 tablet (600 mg total) by mouth every 6 (six) hours as needed for mild pain or moderate pain (Patient not taking: Reported on 9/13/2024) 30 tablet 0    [DISCONTINUED] sildenafil (VIAGRA) 100 mg tablet Take 1 tablet (100 mg total) by mouth daily as needed for erectile dysfunction (Patient not taking: Reported on 9/13/2024) 30 tablet 0     No current facility-administered medications on file prior to visit.      Social History     Tobacco Use    Smoking status: Every Day     Current packs/day: 0.50     Average packs/day: 0.5 packs/day for 39.7 years (19.8 ttl pk-yrs)     Types: Cigarettes     Start date: 1985    Smokeless tobacco: Never   Vaping Use    Vaping status: Never Used   Substance and Sexual Activity    Alcohol use: Yes     Alcohol/week: 2.0 standard drinks of alcohol     Types: 2 Cans of beer per week     Comment: social    Drug use: Yes     Frequency: 7.0 times per week     Types: Marijuana     Comment: 8 blunts daily     Sexual activity: Yes     Partners: Female       Objective   {Disappearing Hyperlinks   Review Vitals * Enter New Vitals * Results Review * Labs * Imaging * Cardiology * Procedures * Lung Cancer Screening * Surgical eConsent :20972}  /94 (BP Location: Left arm, Patient Position: Sitting, Cuff Size: Standard)   Pulse 70   Temp 97.5 °F (36.4 °C) (Tympanic)   Ht 5' 11\" (1.803 m)   Wt 66.3 kg (146 lb 3.2 oz)   SpO2 97%   BMI 20.39 kg/m²     Physical Exam  {Administrative / Billing Section (Optional):39709}  I discussed with him that he is a candidate for lung cancer CT screening.     The following Shared Decision-Making points were covered:  Benefits of screening were discussed, including the rates of reduction in death from lung cancer and other causes.  Harms of screening were reviewed, including false positive tests, " radiation exposure levels, risks of invasive procedures, risks of complications of screening, and risk of overdiagnosis.  I counseled on the importance of adherence to annual lung cancer LDCT screening, impact of co-morbidities, and ability or willingness to undergo diagnosis and treatment.  I counseled on the importance of maintaining abstinence as a former smoker or was counseled on the importance of smoking cessation if a current smoker    Review of Eligibility Criteria: He meets all of the criteria for Lung Cancer Screening.   He is 49 y.o.   He has 20 pack year tobacco history and is a current smoker or has quit within the past 15 years  He presents no signs or symptoms of lung cancer    After discussion, the patient decided to elect lung cancer screening.    Screening for lung cancer  I discussed with him that he is a candidate for lung cancer CT screening.     The following Shared Decision-Making points were covered:  Benefits of screening were discussed, including the rates of reduction in death from lung cancer and other causes.  Harms of screening were reviewed, including false positive tests, radiation exposure levels, risks of invasive procedures, risks of complications of screening, and risk of overdiagnosis.  I counseled on the importance of adherence to annual lung cancer LDCT screening, impact of co-morbidities, and ability or willingness to undergo diagnosis and treatment.  I counseled on the importance of maintaining abstinence as a former smoker or was counseled on the importance of smoking cessation if a current smoker    Review of Eligibility Criteria: He meets all of the criteria for Lung Cancer Screening.   He is 49 y.o.   He has 20 pack year tobacco history and is a current smoker or has quit within the past 15 years  He presents no signs or symptoms of lung cancer    After discussion, the patient decided to elect lung cancer screening.    Orders:    CT lung screening program;  Future    Screening for prostate cancer    Orders:    PSA, Total Screen; Future    Screening for diabetes mellitus    Orders:    Comprehensive metabolic panel; Future    Screening for cardiovascular condition    Orders:    Comprehensive metabolic panel; Future    Lipid panel; Future    Tobacco dependence    Orders:    nicotine polacrilex (COMMIT) 4 MG lozenge; Apply 1 lozenge (4 mg total) to the mouth or throat as needed for smoking cessation    Erectile dysfunction, unspecified erectile dysfunction type    Orders:    tadalafil (CIALIS) 5 MG tablet; Take 2 tablets (10 mg total) by mouth daily as needed for erectile dysfunction

## 2024-09-13 NOTE — ASSESSMENT & PLAN NOTE
Patient is still having difficulty with erectile dysfunction despite taking Viagra 100 mg 30 minutes prior to sexual activity.  Patient states that he is able to get an erection but it does not last the entire day of intercourse.    Patient denies any issues in his relationship, mood disorder, lack of sexual attraction.    At this time we will trial Cialis 10 mg 30 minutes prior to sexual activity to see if this medication improves his sexual experience.  If unsuccessful could consider 5 mg Cialis daily versus urology referral.1 month fu  Orders:    tadalafil (CIALIS) 5 MG tablet; Take 2 tablets (10 mg total) by mouth daily as needed for erectile dysfunction

## 2024-09-13 NOTE — ASSESSMENT & PLAN NOTE
Orders:    nicotine polacrilex (COMMIT) 4 MG lozenge; Apply 1 lozenge (4 mg total) to the mouth or throat as needed for smoking cessation

## 2024-09-13 NOTE — ASSESSMENT & PLAN NOTE
Screening for lung cancer  I discussed with him that he is a candidate for lung cancer CT screening.     The following Shared Decision-Making points were covered:  Benefits of screening were discussed, including the rates of reduction in death from lung cancer and other causes.  Harms of screening were reviewed, including false positive tests, radiation exposure levels, risks of invasive procedures, risks of complications of screening, and risk of overdiagnosis.  I counseled on the importance of adherence to annual lung cancer LDCT screening, impact of co-morbidities, and ability or willingness to undergo diagnosis and treatment.  I counseled on the importance of maintaining abstinence as a former smoker or was counseled on the importance of smoking cessation if a current smoker    Review of Eligibility Criteria: He meets all of the criteria for Lung Cancer Screening.   He is 49 y.o.   He has 20 pack year tobacco history and is a current smoker or has quit within the past 15 years  He presents no signs or symptoms of lung cancer    After discussion, the patient decided to elect lung cancer screening.       Screening for prostate cancer         Screening for diabetes mellitus         Screening for cardiovascular condition         Tobacco dependence         Immunizations and preventive care screenings were discussed with patient today. Appropriate education was printed on patient's after visit summary.    Discussed risks and benefits of prostate cancer screening. We discussed the controversial history of PSA screening for prostate cancer in the [place] as well as the risk of over detection and over treatment of prostate cancer by way of PSA screening.  The patient understands that PSA blood testing is an imperfect way to screen for prostate cancer and that elevated PSA levels in the blood may also be caused by infection, inflammation, prostatic trauma or manipulation, urological procedures, or by benign  prostatic enlargement.    The role of the digital rectal examination in prostate cancer screening was also discussed and I discussed with him that there is large interobserver variability in the findings of digital rectal examination.    Counseling:  Alcohol/drug use: discussed moderation in alcohol intake, the recommendations for healthy alcohol use, and avoidance of illicit drug use.  Dental Health: discussed importance of regular tooth brushing, flossing, and dental visits.  Injury prevention: discussed safety/seat belts, safety helmets, smoke detectors, carbon dioxide detectors, and smoking near bedding or upholstery.  Sexual health: discussed sexually transmitted diseases, partner selection, use of condoms, avoidance of unintended pregnancy, and contraceptive alternatives.  Exercise: the importance of regular exercise/physical activity was discussed. Recommend exercise 3-5 times per week for at least 30 minutes.          History of Present Illness     Adult Annual Physical  Review of Systems  Pertinent Medical History     Medical History Reviewed by provider this encounter:           Medical History Reviewed by provider this encounter:       Past Medical History   No past medical history on file.  Past Surgical History:   Procedure Laterality Date    FOOT SURGERY      ID CORRJ HLX VLGS BNCTY SESMDC W/DOUBLE OSTEOTOMY Right 10/12/2022    Procedure: BUNIONECTOMY (KIM) ROSS OSTEOTOMY 2ND METATARSAL OSTEOTOMY, REPAIR 2ND DIGIT HAMMERTOE;  Surgeon: Nikolay Max DPM;  Location:  MAIN OR;  Service: Podiatry     Family History   Problem Relation Age of Onset    Hypertension Mother     Mental illness Father     Asthma Daughter     Throat cancer Maternal Uncle     Heart disease Maternal Grandmother      Current Outpatient Medications on File Prior to Visit   Medication Sig Dispense Refill    Diclofenac Sodium (VOLTAREN) 1 % Apply 4 g topically 4 (four) times a day 100 g 1    methocarbamol (ROBAXIN) 500 mg tablet  Take 1 tablet (500 mg total) by mouth 2 (two) times a day (Patient not taking: Reported on 9/13/2024) 20 tablet 0    nicotine (NICODERM CQ) 14 mg/24hr TD 24 hr patch Place 1 patch on the skin every 24 hours (Patient not taking: Reported on 11/27/2023) 28 patch 0    [DISCONTINUED] buPROPion (WELLBUTRIN XL) 150 mg 24 hr tablet Take 1 tablet (150 mg total) by mouth every morning 30 tablet 5    [DISCONTINUED] gabapentin (NEURONTIN) 100 mg capsule  (Patient not taking: Reported on 9/13/2024)      [DISCONTINUED] ibuprofen (MOTRIN) 600 mg tablet Take 1 tablet (600 mg total) by mouth every 6 (six) hours as needed for mild pain or moderate pain (Patient not taking: Reported on 9/13/2024) 30 tablet 0    [DISCONTINUED] sildenafil (VIAGRA) 100 mg tablet Take 1 tablet (100 mg total) by mouth daily as needed for erectile dysfunction (Patient not taking: Reported on 9/13/2024) 30 tablet 0     No current facility-administered medications on file prior to visit.   No Known Allergies   Current Outpatient Medications on File Prior to Visit   Medication Sig Dispense Refill    Diclofenac Sodium (VOLTAREN) 1 % Apply 4 g topically 4 (four) times a day 100 g 1    methocarbamol (ROBAXIN) 500 mg tablet Take 1 tablet (500 mg total) by mouth 2 (two) times a day (Patient not taking: Reported on 9/13/2024) 20 tablet 0    nicotine (NICODERM CQ) 14 mg/24hr TD 24 hr patch Place 1 patch on the skin every 24 hours (Patient not taking: Reported on 11/27/2023) 28 patch 0    [DISCONTINUED] buPROPion (WELLBUTRIN XL) 150 mg 24 hr tablet Take 1 tablet (150 mg total) by mouth every morning 30 tablet 5    [DISCONTINUED] gabapentin (NEURONTIN) 100 mg capsule  (Patient not taking: Reported on 9/13/2024)      [DISCONTINUED] ibuprofen (MOTRIN) 600 mg tablet Take 1 tablet (600 mg total) by mouth every 6 (six) hours as needed for mild pain or moderate pain (Patient not taking: Reported on 9/13/2024) 30 tablet 0    [DISCONTINUED] sildenafil (VIAGRA) 100 mg tablet  "Take 1 tablet (100 mg total) by mouth daily as needed for erectile dysfunction (Patient not taking: Reported on 9/13/2024) 30 tablet 0     No current facility-administered medications on file prior to visit.      Social History     Tobacco Use    Smoking status: Every Day     Current packs/day: 0.50     Average packs/day: 0.5 packs/day for 39.7 years (19.8 ttl pk-yrs)     Types: Cigarettes     Start date: 1985    Smokeless tobacco: Never   Vaping Use    Vaping status: Never Used   Substance and Sexual Activity    Alcohol use: Yes     Alcohol/week: 2.0 standard drinks of alcohol     Types: 2 Cans of beer per week     Comment: social    Drug use: Yes     Frequency: 7.0 times per week     Types: Marijuana     Comment: 8 blunts daily     Sexual activity: Yes     Partners: Female       Objective     /94 (BP Location: Left arm, Patient Position: Sitting, Cuff Size: Standard)   Pulse 70   Temp 97.5 °F (36.4 °C) (Tympanic)   Ht 5' 11\" (1.803 m)   Wt 66.3 kg (146 lb 3.2 oz)   SpO2 97%   BMI 20.39 kg/m²     Physical Exam    I discussed with him that he is a candidate for lung cancer CT screening.     The following Shared Decision-Making points were covered:  Benefits of screening were discussed, including the rates of reduction in death from lung cancer and other causes.  Harms of screening were reviewed, including false positive tests, radiation exposure levels, risks of invasive procedures, risks of complications of screening, and risk of overdiagnosis.  I counseled on the importance of adherence to annual lung cancer LDCT screening, impact of co-morbidities, and ability or willingness to undergo diagnosis and treatment.  I counseled on the importance of maintaining abstinence as a former smoker or was counseled on the importance of smoking cessation if a current smoker    Review of Eligibility Criteria: He meets all of the criteria for Lung Cancer Screening.   He is 49 y.o.   He has 20 pack year tobacco history " and is a current smoker or has quit within the past 15 years  He presents no signs or symptoms of lung cancer    After discussion, the patient decided to elect lung cancer screening.

## 2024-09-13 NOTE — PATIENT INSTRUCTIONS
"Patient Education     Routine physical for adults   The Basics   Written by the doctors and editors at [facility]   What is a physical? -- A physical is a routine visit, or \"check-up,\" with your doctor. You might also hear it called a \"wellness visit\" or \"preventive visit.\"  During each visit, the doctor will:   Ask about your physical and mental health   Ask about your habits, behaviors, and lifestyle   Do an exam   Give you vaccines if needed   Talk to you about any medicines you take   Give advice about your health   Answer your questions  Getting regular check-ups is an important part of taking care of your health. It can help your doctor find and treat any problems you have. But it's also important for preventing health problems.  A routine physical is different from a \"sick visit.\" A sick visit is when you see a doctor because of a health concern or problem. Since physicals are scheduled ahead of time, you can think about what you want to ask the doctor.  How often should I get a physical? -- It depends on your age and health. In general, for people age 21 years and older:   If you are younger than 50 years, you might be able to get a physical every 3 years.   If you are 50 years or older, your doctor might recommend a physical every year.  If you have an ongoing health condition, like diabetes or high blood pressure, your doctor will probably want to see you more often.  What happens during a physical? -- In general, each visit will include:   Physical exam - The doctor or nurse will check your height, weight, heart rate, and blood pressure. They will also look at your eyes and ears. They will ask about how you are feeling and whether you have any symptoms that bother you.   Medicines - It's a good idea to bring a list of all the medicines you take to each doctor visit. Your doctor will talk to you about your medicines and answer any questions. Tell them if you are having any side effects that bother you. You " "should also tell them if you are having trouble paying for any of your medicines.   Habits and behaviors - This includes:   Your diet   Your exercise habits   Whether you smoke, drink alcohol, or use drugs   Whether you are sexually active   Whether you feel safe at home  Your doctor will talk to you about things you can do to improve your health and lower your risk of health problems. They will also offer help and support. For example, if you want to quit smoking, they can give you advice and might prescribe medicines. If you want to improve your diet or get more physical activity, they can help you with this, too.   Lab tests, if needed - The tests you get will depend on your age and situation. For example, your doctor might want to check your:   Cholesterol   Blood sugar   Iron level   Vaccines - The recommended vaccines will depend on your age, health, and what vaccines you already had. Vaccines are very important because they can prevent certain serious or deadly infections.   Discussion of screening - \"Screening\" means checking for diseases or other health problems before they cause symptoms. Your doctor can recommend screening based on your age, risk, and preferences. This might include tests to check for:   Cancer, such as breast, prostate, cervical, ovarian, colorectal, prostate, lung, or skin cancer   Sexually transmitted infections, such as chlamydia and gonorrhea   Mental health conditions like depression and anxiety  Your doctor will talk to you about the different types of screening tests. They can help you decide which screenings to have. They can also explain what the results might mean.   Answering questions - The physical is a good time to ask the doctor or nurse questions about your health. If needed, they can refer you to other doctors or specialists, too.  Adults older than 65 years often need other care, too. As you get older, your doctor will talk to you about:   How to prevent falling at " home   Hearing or vision tests   Memory testing   How to take your medicines safely   Making sure that you have the help and support you need at home  All topics are updated as new evidence becomes available and our peer review process is complete.  This topic retrieved from [employer] on: May 02, 2024.  Topic 741871 Version 1.0  Release: 32.4.3 - C32.122  © 2024 UpToDate, Inc. and/or its affiliates. All rights reserved.  Consumer Information Use and Disclaimer   Disclaimer: This generalized information is a limited summary of diagnosis, treatment, and/or medication information. It is not meant to be comprehensive and should be used as a tool to help the user understand and/or assess potential diagnostic and treatment options. It does NOT include all information about conditions, treatments, medications, side effects, or risks that may apply to a specific patient. It is not intended to be medical advice or a substitute for the medical advice, diagnosis, or treatment of a health care provider based on the health care provider's examination and assessment of a patient's specific and unique circumstances. Patients must speak with a health care provider for complete information about their health, medical questions, and treatment options, including any risks or benefits regarding use of medications. This information does not endorse any treatments or medications as safe, effective, or approved for treating a specific patient. UpToDate, Inc. and its affiliates disclaim any warranty or liability relating to this information or the use thereof.The use of this information is governed by the Terms of Use, available at https://www.woltersJooceuwer.com/en/know/clinical-effectiveness-terms. 2024© UpToDate, Inc. and its affiliates and/or licensors. All rights reserved.  Copyright   © 2024 UpToDate, Inc. and/or its affiliates. All rights reserved.

## 2024-09-18 ENCOUNTER — TELEPHONE (OUTPATIENT)
Age: 49
End: 2024-09-18

## 2024-09-27 ENCOUNTER — HOSPITAL ENCOUNTER (OUTPATIENT)
Dept: CT IMAGING | Facility: HOSPITAL | Age: 49
End: 2024-09-27
Payer: COMMERCIAL

## 2024-09-27 ENCOUNTER — HOSPITAL ENCOUNTER (OUTPATIENT)
Dept: RADIOLOGY | Facility: HOSPITAL | Age: 49
End: 2024-09-27
Payer: COMMERCIAL

## 2024-09-27 DIAGNOSIS — Z12.2 SCREENING FOR LUNG CANCER: ICD-10-CM

## 2024-09-27 DIAGNOSIS — M25.511 ACUTE PAIN OF RIGHT SHOULDER: ICD-10-CM

## 2024-09-27 PROCEDURE — 73030 X-RAY EXAM OF SHOULDER: CPT

## 2024-09-27 PROCEDURE — 71271 CT THORAX LUNG CANCER SCR C-: CPT

## 2024-10-07 DIAGNOSIS — N52.9 ERECTILE DYSFUNCTION, UNSPECIFIED ERECTILE DYSFUNCTION TYPE: ICD-10-CM

## 2024-10-10 RX ORDER — SILDENAFIL 100 MG/1
TABLET, FILM COATED ORAL
Qty: 15 TABLET | Refills: 0 | Status: SHIPPED | OUTPATIENT
Start: 2024-10-10

## 2024-10-10 NOTE — TELEPHONE ENCOUNTER
Patient called and spoken too as at last visit Cialis was prescribed and Viagra was discontinued. Patient should not be on both medications.    Although, patient never started taking Cialis due to insurance not covering it. Patient requesting Viagra for now until long term solution is reached. Patient will be seen in office on Monday 10/14/24. Supply provided.     Armando Canseco, DO  PGY-2 Family Medicine

## 2024-10-17 DIAGNOSIS — R91.1 LUNG NODULE: Primary | ICD-10-CM

## 2024-10-17 DIAGNOSIS — F17.200 TOBACCO DEPENDENCE: ICD-10-CM

## 2024-10-23 DIAGNOSIS — N52.9 ERECTILE DYSFUNCTION, UNSPECIFIED ERECTILE DYSFUNCTION TYPE: ICD-10-CM

## 2024-10-25 RX ORDER — SILDENAFIL 100 MG/1
100 TABLET, FILM COATED ORAL AS NEEDED
Qty: 30 TABLET | Refills: 0 | Status: SHIPPED | OUTPATIENT
Start: 2024-10-25

## 2024-11-17 DIAGNOSIS — N52.9 ERECTILE DYSFUNCTION, UNSPECIFIED ERECTILE DYSFUNCTION TYPE: ICD-10-CM

## 2024-11-18 RX ORDER — SILDENAFIL 100 MG/1
100 TABLET, FILM COATED ORAL AS NEEDED
Qty: 30 TABLET | Refills: 1 | Status: SHIPPED | OUTPATIENT
Start: 2024-11-18

## 2024-12-06 DIAGNOSIS — N52.9 ERECTILE DYSFUNCTION, UNSPECIFIED ERECTILE DYSFUNCTION TYPE: ICD-10-CM

## 2024-12-06 RX ORDER — SILDENAFIL 100 MG/1
100 TABLET, FILM COATED ORAL AS NEEDED
Qty: 30 TABLET | Refills: 2 | Status: SHIPPED | OUTPATIENT
Start: 2024-12-06

## 2024-12-10 ENCOUNTER — HOSPITAL ENCOUNTER (EMERGENCY)
Facility: HOSPITAL | Age: 49
Discharge: HOME | End: 2024-12-10
Attending: EMERGENCY MEDICINE

## 2024-12-10 ENCOUNTER — APPOINTMENT (EMERGENCY)
Dept: RADIOLOGY | Facility: HOSPITAL | Age: 49
End: 2024-12-10

## 2024-12-10 VITALS
HEART RATE: 66 BPM | TEMPERATURE: 97.9 F | SYSTOLIC BLOOD PRESSURE: 113 MMHG | OXYGEN SATURATION: 100 % | RESPIRATION RATE: 17 BRPM | DIASTOLIC BLOOD PRESSURE: 73 MMHG

## 2024-12-10 DIAGNOSIS — M25.511 RIGHT SHOULDER PAIN: Primary | ICD-10-CM

## 2024-12-10 PROCEDURE — 73030 X-RAY EXAM OF SHOULDER: CPT

## 2024-12-10 PROCEDURE — 96372 THER/PROPH/DIAG INJ SC/IM: CPT

## 2024-12-10 PROCEDURE — 99283 EMERGENCY DEPT VISIT LOW MDM: CPT

## 2024-12-10 PROCEDURE — 99284 EMERGENCY DEPT VISIT MOD MDM: CPT | Performed by: EMERGENCY MEDICINE

## 2024-12-10 RX ORDER — LIDOCAINE 50 MG/G
1 PATCH TOPICAL ONCE
Status: DISCONTINUED | OUTPATIENT
Start: 2024-12-10 | End: 2024-12-11 | Stop reason: HOSPADM

## 2024-12-10 RX ORDER — ACETAMINOPHEN 325 MG/1
975 TABLET ORAL ONCE
Status: COMPLETED | OUTPATIENT
Start: 2024-12-10 | End: 2024-12-10

## 2024-12-10 RX ORDER — METHOCARBAMOL 500 MG/1
500 TABLET, FILM COATED ORAL ONCE
Status: COMPLETED | OUTPATIENT
Start: 2024-12-10 | End: 2024-12-10

## 2024-12-10 RX ORDER — KETOROLAC TROMETHAMINE 30 MG/ML
30 INJECTION, SOLUTION INTRAMUSCULAR; INTRAVENOUS ONCE
Status: COMPLETED | OUTPATIENT
Start: 2024-12-10 | End: 2024-12-10

## 2024-12-10 RX ORDER — NAPROXEN 500 MG/1
500 TABLET ORAL EVERY 12 HOURS PRN
Qty: 14 TABLET | Refills: 0 | Status: SHIPPED | OUTPATIENT
Start: 2024-12-11 | End: 2024-12-21

## 2024-12-10 RX ADMIN — ACETAMINOPHEN 975 MG: 325 TABLET, FILM COATED ORAL at 21:31

## 2024-12-10 RX ADMIN — LIDOCAINE 1 PATCH: 50 PATCH TOPICAL at 21:32

## 2024-12-10 RX ADMIN — KETOROLAC TROMETHAMINE 30 MG: 30 INJECTION, SOLUTION INTRAMUSCULAR at 21:31

## 2024-12-10 RX ADMIN — METHOCARBAMOL TABLETS 500 MG: 500 TABLET, COATED ORAL at 21:32

## 2024-12-10 NOTE — Clinical Note
Dimitri Patel was seen and treated in our emergency department on 12/10/2024.                Diagnosis:     Dimitri  may return to work on return date.    He may return on this date: 12/13/2024         If you have any questions or concerns, please don't hesitate to call.      Shamar Vargas MD    ______________________________           _______________          _______________  Hospital Representative                              Date                                Time

## 2024-12-11 NOTE — ED PROVIDER NOTES
Time reflects when diagnosis was documented in both MDM as applicable and the Disposition within this note       Time User Action Codes Description Comment    12/10/2024  9:24 PM Shamar Vargas Add [M25.511] Right shoulder pain           ED Disposition       ED Disposition   Discharge    Condition   Stable    Date/Time   Tue Dec 10, 2024 10:02 PM    Comment   Dimitri Patel discharge to home/self care.                   Assessment & Plan       Medical Decision Making  49 year old male presents for evaluation of two weeks of right shoulder pain. No red flag symptoms. Exam revealed sharp pain on flexion, extension and external rotation of right shoulder. Tenderness to palpation near right AC joint. Positive Salomon test, Neer sign and empty can test. Consider shoulder impingement, muscle strain, frozen shoulder, shoulder fracture, cervical radiculopathy, osteoarthritis. Pt denies fevers and there is no swelling, redness or deformity on exam to suggest septic arthritis. Pt provided Lidocaine patch, Robaxin, Toradol and Tylenol for pain relief. X-ray right shoulder showed no acute fracture or dislocation. Pt is stable for discharge. Pt has been instructed to follow up with orthopedic surgery outpatient and to take Naproxen for pain relief. Return precautions provided.    Amount and/or Complexity of Data Reviewed  Radiology: ordered and independent interpretation performed. Decision-making details documented in ED Course.    Risk  OTC drugs.  Prescription drug management.        ED Course as of 12/10/24 2233   Tue Dec 10, 2024   2221 XR shoulder 2+ views RIGHT  No acute fracture or dislocation       Medications   lidocaine (LIDODERM) 5 % patch 1 patch (1 patch Topical Medication Applied 12/10/24 2132)   ketorolac (TORADOL) injection 30 mg (30 mg Intramuscular Given 12/10/24 2131)   acetaminophen (TYLENOL) tablet 975 mg (975 mg Oral Given 12/10/24 2131)   methocarbamol (ROBAXIN) tablet 500 mg (500 mg Oral Given  12/10/24 2132)       ED Risk Strat Scores                                               History of Present Illness       Chief Complaint   Patient presents with    Shoulder Pain     Pt reports right shoulder pain for the past few months. No meds pta.        History reviewed. No pertinent past medical history.   Past Surgical History:   Procedure Laterality Date    FOOT SURGERY      SD CORRJ HLX VLGS BNCTY SESMDC W/DOUBLE OSTEOTOMY Right 10/12/2022    Procedure: BUNIONECTOMY (KIM) ROSS OSTEOTOMY 2ND METATARSAL OSTEOTOMY, REPAIR 2ND DIGIT HAMMERTOE;  Surgeon: Nikolay Max DPM;  Location:  MAIN OR;  Service: Podiatry      Family History   Problem Relation Age of Onset    Hypertension Mother     Mental illness Father     Asthma Daughter     Throat cancer Maternal Uncle     Heart disease Maternal Grandmother       Social History     Tobacco Use    Smoking status: Every Day     Current packs/day: 0.50     Average packs/day: 0.5 packs/day for 39.9 years (20.0 ttl pk-yrs)     Types: Cigarettes     Start date: 1985    Smokeless tobacco: Never   Vaping Use    Vaping status: Never Used   Substance Use Topics    Alcohol use: Not Currently     Alcohol/week: 2.0 standard drinks of alcohol     Types: 2 Cans of beer per week     Comment: social    Drug use: Yes     Frequency: 7.0 times per week     Types: Marijuana     Comment: 8 blunts daily       E-Cigarette/Vaping    E-Cigarette Use Never User       E-Cigarette/Vaping Substances    Nicotine No     THC No     CBD No     Flavoring No     Other No     Unknown No       I have reviewed and agree with the history as documented.     Pt is a 49 year old male who presents for right shoulder pain. Pt states that he initially had dull pain in his left shoulder two months ago but two weeks ago began to have acute-onset sharp right shoulder pain. Pt also began to have mild, sharp neck pain around this time. The pain occasionally radiates to the pt's upper arm. Lifting his right arm and  laying down worsens his shoulder pain. Pt denies recent trauma but states his job requires heavy lifting. Pt has taken Robaxin, which occasionally provides relief. Pt denies fever, chest pain, shortness of breath, or swelling or redness around the right shoulder.      History provided by:  Patient  Shoulder Pain  Location:  Shoulder  Shoulder location:  R shoulder  Injury: no    Pain details:     Quality:  Sharp    Duration:  2 weeks    Timing:  Constant  Relieved by:  Muscle relaxant  Worsened by:  Movement  Associated symptoms: neck pain    Associated symptoms: no back pain and no fever        Review of Systems   Constitutional:  Negative for chills and fever.   Respiratory:  Negative for shortness of breath and wheezing.    Cardiovascular:  Negative for chest pain.   Gastrointestinal:  Negative for diarrhea, nausea and vomiting.   Musculoskeletal:  Positive for arthralgias and neck pain. Negative for back pain, gait problem and myalgias.   Skin:  Negative for rash.   Neurological:  Negative for light-headedness.           Objective       ED Triage Vitals [12/10/24 2059]   Temperature Pulse Blood Pressure Respirations SpO2 Patient Position - Orthostatic VS   97.9 °F (36.6 °C) 66 113/73 17 100 % Sitting      Temp Source Heart Rate Source BP Location FiO2 (%) Pain Score    Oral Monitor Left arm -- 8      Vitals      Date and Time Temp Pulse SpO2 Resp BP Pain Score FACES Pain Rating User   12/10/24 2131 -- -- -- -- -- 8 -- AF   12/10/24 2059 97.9 °F (36.6 °C) 66 100 % 17 113/73 8 -- AM            Physical Exam  Constitutional:       General: He is not in acute distress.     Appearance: He is not ill-appearing.   HENT:      Head: Normocephalic.   Eyes:      Extraocular Movements: Extraocular movements intact.   Cardiovascular:      Rate and Rhythm: Normal rate and regular rhythm.      Heart sounds: No murmur heard.     No gallop.   Pulmonary:      Effort: Pulmonary effort is normal. No respiratory distress.       Breath sounds: No wheezing, rhonchi or rales.   Abdominal:      General: Abdomen is flat. There is no distension.   Musculoskeletal:      Right shoulder: Tenderness (AC joint) present. No swelling, deformity or effusion. Decreased range of motion.      Left shoulder: No swelling, deformity or effusion.      Comments: Pain on flexion, extension, and external rotation of right shoulder  Positive Salomon test, Neer's sign and empty can test on right shoulder   Skin:     General: Skin is warm.   Neurological:      Mental Status: He is alert and oriented to person, place, and time.   Psychiatric:         Mood and Affect: Mood normal.         Behavior: Behavior normal.         Results Reviewed       None            XR shoulder 2+ views RIGHT   ED Interpretation by Shamar Vargas MD (12/10 2202)   No acute fracture or dislocation          Procedures    ED Medication and Procedure Management   Prior to Admission Medications   Prescriptions Last Dose Informant Patient Reported? Taking?   Diclofenac Sodium (VOLTAREN) 1 %   No No   Sig: Apply 4 g topically 4 (four) times a day   methocarbamol (ROBAXIN) 500 mg tablet   No No   Sig: Take 1 tablet (500 mg total) by mouth 2 (two) times a day   Patient not taking: Reported on 9/13/2024   nicotine (NICODERM CQ) 14 mg/24hr TD 24 hr patch   No No   Sig: Place 1 patch on the skin every 24 hours   Patient not taking: Reported on 11/27/2023   nicotine polacrilex (COMMIT) 4 MG lozenge   No No   Sig: Apply 1 lozenge (4 mg total) to the mouth or throat as needed for smoking cessation   sildenafil (VIAGRA) 100 mg tablet   No No   Sig: Take 1 tablet (100 mg total) by mouth as needed for erectile dysfunction      Facility-Administered Medications: None     Discharge Medication List as of 12/10/2024 10:04 PM        START taking these medications    Details   naproxen (Naprosyn) 500 mg tablet Take 1 tablet (500 mg total) by mouth every 12 (twelve) hours as needed (pain) for up to 10 days Take  with food. Do not start before December 11, 2024., Starting Wed 12/11/2024, Until Sat 12/21/2024 at 2359, Normal           CONTINUE these medications which have NOT CHANGED    Details   Diclofenac Sodium (VOLTAREN) 1 % Apply 4 g topically 4 (four) times a day, Starting Fri 8/9/2024, Normal      methocarbamol (ROBAXIN) 500 mg tablet Take 1 tablet (500 mg total) by mouth 2 (two) times a day, Starting Fri 8/9/2024, Normal      nicotine (NICODERM CQ) 14 mg/24hr TD 24 hr patch Place 1 patch on the skin every 24 hours, Starting Mon 10/3/2022, Normal      nicotine polacrilex (COMMIT) 4 MG lozenge Apply 1 lozenge (4 mg total) to the mouth or throat as needed for smoking cessation, Starting Fri 9/13/2024, Normal      sildenafil (VIAGRA) 100 mg tablet Take 1 tablet (100 mg total) by mouth as needed for erectile dysfunction, Starting Fri 12/6/2024, Normal             ED SEPSIS DOCUMENTATION   Time reflects when diagnosis was documented in both MDM as applicable and the Disposition within this note       Time User Action Codes Description Comment    12/10/2024  9:24 PM Shamar Vargas Add [M25.511] Right shoulder pain                  Leyla Dominguez MD  12/10/24 1983

## 2024-12-11 NOTE — ED ATTENDING ATTESTATION
I interviewed, took the history and examined the patient.  I discussed the case with the Resident and reviewed the Resident’s note , prescribed medications, and orders placed.  I supervised the Resident and I agree with the Resident management plan as it was presented to me.  I was present in the clinic and examined the patient.    Shamar Vargas MD 12/10/24    Patient is a 49-year-old male seen in the emergency department with concern for right shoulder pain.  Patient was treated with medication for symptom control, with good effect.  X-ray right shoulder showed no acute fracture or dislocation.  Evaluation is consistent with possible osteoarthritis/other musculoskeletal pain/ligamentous injury.  Plan to have patient follow up with orthopedics/outpatient providers.  Patient stable for discharge home.  Discharge instructions were reviewed with patient.

## 2024-12-27 DIAGNOSIS — N52.9 ERECTILE DYSFUNCTION, UNSPECIFIED ERECTILE DYSFUNCTION TYPE: ICD-10-CM

## 2024-12-28 RX ORDER — SILDENAFIL 100 MG/1
100 TABLET, FILM COATED ORAL AS NEEDED
Qty: 30 TABLET | Refills: 0 | Status: SHIPPED | OUTPATIENT
Start: 2024-12-28

## 2025-01-29 DIAGNOSIS — N52.9 ERECTILE DYSFUNCTION, UNSPECIFIED ERECTILE DYSFUNCTION TYPE: ICD-10-CM

## 2025-01-29 RX ORDER — SILDENAFIL 100 MG/1
100 TABLET, FILM COATED ORAL AS NEEDED
Qty: 30 TABLET | Refills: 0 | Status: SHIPPED | OUTPATIENT
Start: 2025-01-29

## 2025-02-21 DIAGNOSIS — N52.9 ERECTILE DYSFUNCTION, UNSPECIFIED ERECTILE DYSFUNCTION TYPE: ICD-10-CM

## 2025-02-21 RX ORDER — SILDENAFIL 100 MG/1
TABLET, FILM COATED ORAL
Qty: 30 TABLET | Refills: 1 | Status: SHIPPED | OUTPATIENT
Start: 2025-02-21

## 2025-03-03 DIAGNOSIS — N52.9 ERECTILE DYSFUNCTION, UNSPECIFIED ERECTILE DYSFUNCTION TYPE: ICD-10-CM

## 2025-03-04 RX ORDER — SILDENAFIL 100 MG/1
TABLET, FILM COATED ORAL
Qty: 30 TABLET | Refills: 0 | OUTPATIENT
Start: 2025-03-04

## 2025-03-10 ENCOUNTER — APPOINTMENT (EMERGENCY)
Dept: RADIOLOGY | Facility: HOSPITAL | Age: 50
End: 2025-03-10
Payer: COMMERCIAL

## 2025-03-10 ENCOUNTER — HOSPITAL ENCOUNTER (EMERGENCY)
Facility: HOSPITAL | Age: 50
Discharge: HOME | End: 2025-03-10
Attending: EMERGENCY MEDICINE | Admitting: EMERGENCY MEDICINE
Payer: COMMERCIAL

## 2025-03-10 VITALS
WEIGHT: 158 LBS | RESPIRATION RATE: 16 BRPM | HEIGHT: 71 IN | HEART RATE: 68 BPM | BODY MASS INDEX: 22.12 KG/M2 | SYSTOLIC BLOOD PRESSURE: 120 MMHG | TEMPERATURE: 98 F | DIASTOLIC BLOOD PRESSURE: 71 MMHG | OXYGEN SATURATION: 97 %

## 2025-03-10 DIAGNOSIS — M54.2 NECK PAIN: Primary | ICD-10-CM

## 2025-03-10 DIAGNOSIS — M54.6 THORACIC BACK PAIN: ICD-10-CM

## 2025-03-10 PROCEDURE — 96372 THER/PROPH/DIAG INJ SC/IM: CPT

## 2025-03-10 PROCEDURE — 72040 X-RAY EXAM NECK SPINE 2-3 VW: CPT

## 2025-03-10 PROCEDURE — 99284 EMERGENCY DEPT VISIT MOD MDM: CPT | Performed by: EMERGENCY MEDICINE

## 2025-03-10 PROCEDURE — 99283 EMERGENCY DEPT VISIT LOW MDM: CPT

## 2025-03-10 PROCEDURE — 72070 X-RAY EXAM THORAC SPINE 2VWS: CPT

## 2025-03-10 RX ORDER — ACETAMINOPHEN 325 MG/1
975 TABLET ORAL ONCE
Status: COMPLETED | OUTPATIENT
Start: 2025-03-10 | End: 2025-03-10

## 2025-03-10 RX ORDER — LIDOCAINE 50 MG/G
1 PATCH TOPICAL DAILY
Qty: 30 PATCH | Refills: 0 | Status: SHIPPED | OUTPATIENT
Start: 2025-03-10

## 2025-03-10 RX ORDER — NAPROXEN 500 MG/1
500 TABLET ORAL 2 TIMES DAILY WITH MEALS
Qty: 30 TABLET | Refills: 0 | Status: SHIPPED | OUTPATIENT
Start: 2025-03-10

## 2025-03-10 RX ORDER — METHOCARBAMOL 500 MG/1
500 TABLET, FILM COATED ORAL 3 TIMES DAILY PRN
Qty: 20 TABLET | Refills: 0 | Status: SHIPPED | OUTPATIENT
Start: 2025-03-10

## 2025-03-10 RX ORDER — ACETAMINOPHEN 500 MG
1000 TABLET ORAL EVERY 6 HOURS PRN
Qty: 40 TABLET | Refills: 0 | Status: SHIPPED | OUTPATIENT
Start: 2025-03-10

## 2025-03-10 RX ORDER — KETOROLAC TROMETHAMINE 30 MG/ML
15 INJECTION, SOLUTION INTRAMUSCULAR; INTRAVENOUS ONCE
Status: COMPLETED | OUTPATIENT
Start: 2025-03-10 | End: 2025-03-10

## 2025-03-10 RX ORDER — LIDOCAINE 50 MG/G
2 PATCH TOPICAL ONCE
Status: DISCONTINUED | OUTPATIENT
Start: 2025-03-10 | End: 2025-03-10 | Stop reason: HOSPADM

## 2025-03-10 RX ADMIN — KETOROLAC TROMETHAMINE 15 MG: 30 INJECTION, SOLUTION INTRAMUSCULAR at 19:47

## 2025-03-10 RX ADMIN — LIDOCAINE 5% 2 PATCH: 700 PATCH TOPICAL at 19:47

## 2025-03-10 RX ADMIN — ACETAMINOPHEN 975 MG: 325 TABLET, FILM COATED ORAL at 19:47

## 2025-03-10 NOTE — Clinical Note
Dimitri Patel was seen and treated in our emergency department on 3/10/2025.                Diagnosis: Back Pain    Dimitri  may return to work on return date.    He may return on this date: 03/13/2025         If you have any questions or concerns, please don't hesitate to call.      Abdon Cartagena, DO    ______________________________           _______________          _______________  Hospital Representative                              Date                                Time

## 2025-03-10 NOTE — ED PROVIDER NOTES
Time reflects when diagnosis was documented in both MDM as applicable and the Disposition within this note       Time User Action Codes Description Comment    3/10/2025  8:07 PM Abdon Cartagena Add [M54.2] Neck pain     3/10/2025  8:07 PM Abdon Cartagena [M54.6] Thoracic back pain           ED Disposition       ED Disposition   Discharge    Condition   Stable    Date/Time   Mon Mar 10, 2025  8:06 PM    Comment   Dimitri Patel discharge to home/self care.                   Assessment & Plan       Medical Decision Making  Patient with thoracic and cervical spine pain after repetitive movements at work.    No red flags regarding back pain.  Has central spinal tenderness in the mid thoracic and lower cervical region.    Will evaluate for abnormalities with an x-ray.    Will refer to comprehensive spine.  Return precautions given.    Differential including but not limited to back pain, strain, work injury.    Problems Addressed:  Neck pain: acute illness or injury  Thoracic back pain: acute illness or injury    Amount and/or Complexity of Data Reviewed  Radiology: ordered and independent interpretation performed.    Risk  OTC drugs.  Prescription drug management.             Medications   lidocaine (LIDODERM) 5 % patch 2 patch (2 patches Topical Medication Applied 3/10/25 1947)   ketorolac (TORADOL) injection 15 mg (15 mg Intramuscular Given 3/10/25 1947)   acetaminophen (TYLENOL) tablet 975 mg (975 mg Oral Given 3/10/25 1947)       ED Risk Strat Scores                            SBIRT 20yo+      Flowsheet Row Most Recent Value   Initial Alcohol Screen: US AUDIT-C     1. How often do you have a drink containing alcohol? 0 Filed at: 03/10/2025 1919   Audit-C Score 0 Filed at: 03/10/2025 1919   OG: How many times in the past year have you...    Used an illegal drug or used a prescription medication for non-medical reasons? Never Filed at: 03/10/2025 1919                            History of Present  Illness       Chief Complaint   Patient presents with    Back Pain     Pt complaining of back, neck and shoulder pain. Back pain for 6+ months, shoulder and neck new this week.  PT stated he has mild nausea, no dizziness, no chest pain.       History reviewed. No pertinent past medical history.   Past Surgical History:   Procedure Laterality Date    FOOT SURGERY      OR CORRJ HLX VLGS BNCTY SESMDC W/DOUBLE OSTEOTOMY Right 10/12/2022    Procedure: BUNIONECTOMY (KIM) ROSS OSTEOTOMY 2ND METATARSAL OSTEOTOMY, REPAIR 2ND DIGIT HAMMERTOE;  Surgeon: Nikolay Max DPM;  Location:  MAIN OR;  Service: Podiatry      Family History   Problem Relation Age of Onset    Hypertension Mother     Mental illness Father     Asthma Daughter     Throat cancer Maternal Uncle     Heart disease Maternal Grandmother       Social History     Tobacco Use    Smoking status: Every Day     Current packs/day: 0.50     Average packs/day: 0.5 packs/day for 40.2 years (20.1 ttl pk-yrs)     Types: Cigarettes     Start date: 1985    Smokeless tobacco: Never   Vaping Use    Vaping status: Never Used   Substance Use Topics    Alcohol use: Not Currently     Alcohol/week: 2.0 standard drinks of alcohol     Types: 2 Cans of beer per week     Comment: social    Drug use: Yes     Frequency: 7.0 times per week     Types: Marijuana     Comment: 8 blunts daily       E-Cigarette/Vaping    E-Cigarette Use Never User       E-Cigarette/Vaping Substances    Nicotine No     THC No     CBD No     Flavoring No     Other No     Unknown No       I have reviewed and agree with the history as documented.     49-year-old male previous history of foot surgery presenting to the emergency department with back pain.    Patient notes that he has a chronic history of bilateral shoulder pain.  Notes 6+ months of shoulder pain.  Since last week he has also developed midline thoracic back pain at the mid T-spine as well as lower cervical spine pain.  Worse with palpation or  movement.    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    He denies numbness, urinary continence, urinary retention, numbness between the legs, fevers, night sweats, cancer history, weight loss, hemoptysis, fevers, IVDU.    Notes nausea.          Back Pain  Location:  Thoracic spine (cervical)  Quality:  Aching  Radiates to:  Does not radiate  Pain severity:  Moderate  Onset quality:  Gradual  Timing:  Constant      Review of Systems   Musculoskeletal:  Positive for back pain.   All other systems reviewed and are negative.          Objective       ED Triage Vitals [03/10/25 1916]   Temperature Pulse Blood Pressure Respirations SpO2 Patient Position - Orthostatic VS   98 °F (36.7 °C) 68 120/71 16 97 % Sitting      Temp Source Heart Rate Source BP Location FiO2 (%) Pain Score    Oral Monitor Right arm -- 7      Vitals      Date and Time Temp Pulse SpO2 Resp BP Pain Score FACES Pain Rating User   03/10/25 1947 -- -- -- -- -- 7 -- DS   03/10/25 1916 98 °F (36.7 °C) 68 97 % 16 120/71 7 -- SR            Physical Exam  Vitals and nursing note reviewed.   Constitutional:       General: He is not in acute distress.     Appearance: He is well-developed. He is not diaphoretic.   HENT:      Head: Normocephalic and atraumatic.      Right Ear: External ear normal.      Left Ear: External ear normal.   Eyes:      Conjunctiva/sclera: Conjunctivae normal.   Neck:      Trachea: No tracheal deviation.   Cardiovascular:      Rate and Rhythm: Normal rate and regular rhythm.      Heart sounds: Normal heart sounds. No murmur heard.  Pulmonary:      Effort: No respiratory distress.      Breath sounds: Normal breath sounds. No stridor. No wheezing or rales.   Abdominal:      General: Bowel sounds are normal. There is no distension.      Palpations: Abdomen is soft. There is no mass.      Tenderness: There is no abdominal tenderness. There is no guarding or rebound.   Musculoskeletal:          General: Tenderness present. No deformity.      Comments: Pain with palpation approximately C6 as well as T6.       Skin:     General: Skin is dry.      Findings: No rash.   Neurological:      Sensory: No sensory deficit.      Motor: No weakness or abnormal muscle tone.      Coordination: Coordination normal.   Psychiatric:         Behavior: Behavior normal.         Thought Content: Thought content normal.         Judgment: Judgment normal.         Results Reviewed       None            XR spine cervical 2 or 3 vw injury   ED Interpretation by Abdon Cartagena DO (03/10 1953)   Degenerative changes.  No acute orthopedic findings.      XR spine thoracic 2 views   ED Interpretation by Abdon Cartagena DO (03/10 1953)   No acute orthopedic findings.          Procedures    ED Medication and Procedure Management   Prior to Admission Medications   Prescriptions Last Dose Informant Patient Reported? Taking?   Diclofenac Sodium (VOLTAREN) 1 %   No No   Sig: Apply 4 g topically 4 (four) times a day   methocarbamol (ROBAXIN) 500 mg tablet   No No   Sig: Take 1 tablet (500 mg total) by mouth 2 (two) times a day   Patient not taking: Reported on 9/13/2024   naproxen (Naprosyn) 500 mg tablet   No No   Sig: Take 1 tablet (500 mg total) by mouth every 12 (twelve) hours as needed (pain) for up to 10 days Take with food. Do not start before December 11, 2024.   nicotine (NICODERM CQ) 14 mg/24hr TD 24 hr patch   No No   Sig: Place 1 patch on the skin every 24 hours   Patient not taking: Reported on 11/27/2023   nicotine polacrilex (COMMIT) 4 MG lozenge   No No   Sig: Apply 1 lozenge (4 mg total) to the mouth or throat as needed for smoking cessation   sildenafil (VIAGRA) 100 mg tablet   No No   Sig: TAKE 1 TABLET BY MOUTH ONCE DAILY AS NEEDED FOR ERECTILE DYSFUNCTION      Facility-Administered Medications: None     Patient's Medications   Discharge Prescriptions    ACETAMINOPHEN (TYLENOL) 500 MG TABLET    Take 2 tablets  (1,000 mg total) by mouth every 6 (six) hours as needed for moderate pain       Start Date: 3/10/2025 End Date: --       Order Dose: 1,000 mg       Quantity: 40 tablet    Refills: 0    LIDOCAINE (LIDODERM) 5 %    Apply 1 patch topically over 12 hours daily Remove & Discard patch within 12 hours or as directed by MD       Start Date: 3/10/2025 End Date: --       Order Dose: 1 patch       Quantity: 30 patch    Refills: 0    METHOCARBAMOL (ROBAXIN) 500 MG TABLET    Take 1 tablet (500 mg total) by mouth 3 (three) times a day as needed for muscle spasms       Start Date: 3/10/2025 End Date: --       Order Dose: 500 mg       Quantity: 20 tablet    Refills: 0    NAPROXEN (NAPROSYN) 500 MG TABLET    Take 1 tablet (500 mg total) by mouth 2 (two) times a day with meals       Start Date: 3/10/2025 End Date: --       Order Dose: 500 mg       Quantity: 30 tablet    Refills: 0       ED SEPSIS DOCUMENTATION   Time reflects when diagnosis was documented in both MDM as applicable and the Disposition within this note       Time User Action Codes Description Comment    3/10/2025  8:07 PM Abdon Cartagena [M54.2] Neck pain     3/10/2025  8:07 PM Abdon Cartagena [M54.6] Thoracic back pain                  Abdon Cartagena DO  03/10/25 2010

## 2025-03-11 ENCOUNTER — TELEPHONE (OUTPATIENT)
Dept: PHYSICAL THERAPY | Facility: OTHER | Age: 50
End: 2025-03-11

## 2025-03-11 NOTE — TELEPHONE ENCOUNTER
"Nurse reached out to discuss the recent referral entered for  [facility].    RN provided the patient with an overview of the program including the Triage process & referral entry.    Patient stated he was unable to participate in the triage questioning at this time. He asked if the nurse could CB. This RN stated it would be better if he called the program back instead. The nurse stated, \" you can call back when you're available to complete the questions\". Patient agreed and understood.   Nurse confirmed the patient had the contact information for [facility] and encouraged him to call the program back. Patient agreed and was very appreciative of the f/u call and information.  Nurse provided him with the hours of operation as well.    Nurse wished him well and the referral was closed per protocol. Will await possible CB from the patient.  "

## 2025-03-11 NOTE — DISCHARGE INSTRUCTIONS
Take Tylenol every 6 hours, naproxen every 12 hours.  Use the Robaxin muscle relaxer 3 times daily as needed for pain.    Come back for new or worsening pain including but not limited to chest pain, ripping/tearing pain, shortness of breath, numbness, weakness.

## 2025-03-12 ENCOUNTER — NURSE TRIAGE (OUTPATIENT)
Dept: PHYSICAL THERAPY | Facility: OTHER | Age: 50
End: 2025-03-12

## 2025-03-12 DIAGNOSIS — M54.9 UPPER BACK PAIN, CHRONIC: ICD-10-CM

## 2025-03-12 DIAGNOSIS — M54.2 ACUTE NECK PAIN: Primary | ICD-10-CM

## 2025-03-12 DIAGNOSIS — G89.29 UPPER BACK PAIN, CHRONIC: ICD-10-CM

## 2025-03-12 NOTE — TELEPHONE ENCOUNTER
Additional Information   Negative: Has the patient had unexplained weight loss?   Negative: Does the patient have a fever?   Negative: Is the patient experiencing blood in sputum?   Negative: Is the patient experiencing urine retention?   Negative: Is the patient experiencing acute drop foot or paralysis?   Negative: Has the patient experienced major trauma? (fall from height, high speed collision, direct blow to spine) and is also experiencing nausea, light-headedness, or loss of consciousness?   Negative: Is this a chronic condition?    Protocols used: [facility] Protocol    [facility] was reviewed in detail and what we can provide for their {bback and neck pain.  Patient is agreeable to being triaged and would like to proceed with Physical Therapy.    Referral was placed for Physical Therapy at the [facility] site. Patients information was sent to the  to make evaluation appointment. Patient made aware that the PT office  will be calling to schedule the appointment.  Patient was provided with the phone number to the PT office.    No further questions and/or concerns were voiced by the patient at this time. Patient states understanding of the referral that was placed.    Referral Closed.

## 2025-03-12 NOTE — TELEPHONE ENCOUNTER
"Patient called into [facility] today and left v/m @8:55am.    Returned patient's call @9:10am.      Additional Information   Negative: Is this related to a work injury?   Negative: Is this related to an MVA?   Negative: Are you currently recieving homecare services?    Background - Initial Assessment  Clinical complaint: ED visit on 03/10 due to Middle upper Back Pain that started 6 months ago and it started radiating up to B/L neck this week. No radiating down his shoulders or arms. He does have shoulder pain but he believes is not related and he will see Ortho for it. Numbness \"sometimes\" in right hand. No tingling sensation. Not seeing a spine/cervical specialist yet. Pain is worse when laying down or sitting for too long and getting up from a seated position. NKI. Pain is intermittent \"comes and goes\". Patient described it as aching, burning, sharp, shooting, stabbing, throbbing and dull.  Date of onset: 6 months ago (upper back) radiating to neck (this week)  Frequency of pain: intermittent  Quality of pain: aching, burning, dull, numb, sharp, shooting, stabbing, and throbbing.    Protocols used: [facility] Protocol    "

## 2025-03-20 DIAGNOSIS — N52.9 ERECTILE DYSFUNCTION, UNSPECIFIED ERECTILE DYSFUNCTION TYPE: ICD-10-CM

## 2025-03-20 RX ORDER — SILDENAFIL 100 MG/1
TABLET, FILM COATED ORAL
Qty: 30 TABLET | Refills: 0 | OUTPATIENT
Start: 2025-03-20

## 2025-04-16 DIAGNOSIS — N52.9 ERECTILE DYSFUNCTION, UNSPECIFIED ERECTILE DYSFUNCTION TYPE: ICD-10-CM

## 2025-04-17 RX ORDER — SILDENAFIL 100 MG/1
100 TABLET, FILM COATED ORAL AS NEEDED
Qty: 30 TABLET | Refills: 0 | Status: SHIPPED | OUTPATIENT
Start: 2025-04-17

## 2025-05-06 DIAGNOSIS — N52.9 ERECTILE DYSFUNCTION, UNSPECIFIED ERECTILE DYSFUNCTION TYPE: ICD-10-CM

## 2025-05-07 RX ORDER — SILDENAFIL 100 MG/1
100 TABLET, FILM COATED ORAL AS NEEDED
Qty: 30 TABLET | Refills: 1 | Status: SHIPPED | OUTPATIENT
Start: 2025-05-07

## 2025-06-02 DIAGNOSIS — N52.9 ERECTILE DYSFUNCTION, UNSPECIFIED ERECTILE DYSFUNCTION TYPE: ICD-10-CM

## 2025-06-03 RX ORDER — SILDENAFIL 100 MG/1
100 TABLET, FILM COATED ORAL AS NEEDED
Qty: 30 TABLET | Refills: 0 | Status: SHIPPED | OUTPATIENT
Start: 2025-06-03

## 2025-06-06 ENCOUNTER — OFFICE VISIT (OUTPATIENT)
Dept: FAMILY MEDICINE CLINIC | Facility: CLINIC | Age: 50
End: 2025-06-06

## 2025-06-06 VITALS
HEIGHT: 71 IN | WEIGHT: 155 LBS | TEMPERATURE: 98 F | DIASTOLIC BLOOD PRESSURE: 79 MMHG | HEART RATE: 84 BPM | BODY MASS INDEX: 21.7 KG/M2 | SYSTOLIC BLOOD PRESSURE: 121 MMHG | OXYGEN SATURATION: 98 %

## 2025-06-06 DIAGNOSIS — N52.9 ERECTILE DYSFUNCTION, UNSPECIFIED ERECTILE DYSFUNCTION TYPE: ICD-10-CM

## 2025-06-06 DIAGNOSIS — R00.2 PALPITATIONS: ICD-10-CM

## 2025-06-06 DIAGNOSIS — F17.210 CIGARETTE NICOTINE DEPENDENCE WITHOUT COMPLICATION: Primary | ICD-10-CM

## 2025-06-06 DIAGNOSIS — F17.200 TOBACCO DEPENDENCE: ICD-10-CM

## 2025-06-06 PROBLEM — N17.9 AKI (ACUTE KIDNEY INJURY) (HCC): Status: RESOLVED | Noted: 2022-02-10 | Resolved: 2025-06-06

## 2025-06-06 PROBLEM — M25.461 PREPATELLAR EFFUSION OF RIGHT KNEE: Status: RESOLVED | Noted: 2023-11-27 | Resolved: 2025-06-06

## 2025-06-06 PROBLEM — Z00.00 ANNUAL PHYSICAL EXAM: Status: RESOLVED | Noted: 2023-05-09 | Resolved: 2025-06-06

## 2025-06-06 PROBLEM — R79.89 ABNORMAL CBC: Status: RESOLVED | Noted: 2021-03-01 | Resolved: 2025-06-06

## 2025-06-06 PROBLEM — R06.09 DOE (DYSPNEA ON EXERTION): Status: RESOLVED | Noted: 2022-01-11 | Resolved: 2025-06-06

## 2025-06-06 PROBLEM — M79.672 BILATERAL FOOT PAIN: Status: RESOLVED | Noted: 2022-08-19 | Resolved: 2025-06-06

## 2025-06-06 PROBLEM — M79.671 BILATERAL FOOT PAIN: Status: RESOLVED | Noted: 2022-08-19 | Resolved: 2025-06-06

## 2025-06-06 PROBLEM — R53.83 OTHER FATIGUE: Status: RESOLVED | Noted: 2023-05-09 | Resolved: 2025-06-06

## 2025-06-06 PROBLEM — R30.0 DYSURIA: Status: RESOLVED | Noted: 2022-01-11 | Resolved: 2025-06-06

## 2025-06-06 PROBLEM — K21.9 GASTROESOPHAGEAL REFLUX DISEASE WITHOUT ESOPHAGITIS: Status: RESOLVED | Noted: 2021-03-01 | Resolved: 2025-06-06

## 2025-06-06 PROBLEM — M62.838 TRAPEZIUS MUSCLE SPASM: Status: RESOLVED | Noted: 2024-08-11 | Resolved: 2025-06-06

## 2025-06-06 PROCEDURE — 99213 OFFICE O/P EST LOW 20 MIN: CPT

## 2025-06-06 RX ORDER — VARENICLINE TARTRATE 0.5 (11)-1
KIT ORAL
Qty: 53 EACH | Refills: 0 | Status: SHIPPED | OUTPATIENT
Start: 2025-06-06

## 2025-06-06 NOTE — PROGRESS NOTES
Name: Dimitri Patel      : 1975      MRN: [id number]  Encounter Provider: Grzegorz Alcala MD  Encounter Date: 2025   Encounter department: [facility]  :  Assessment & Plan  Cigarette nicotine dependence without complication  See plan below  Orders:    Varenicline Tartrate, Starter, (Chantix Starting Month Devyn) 0.5 MG X 11 & 1 MG X 42 TBPK; 0.5 mg once daily for 3 days then 0.5mg twice daily for days 4-7 then 1 mg twice daily    Tobacco dependence  Smoking 1.5-2 packs of cigarettes daily and would like to quit. Did not experience benefit from patches or lozenges. Interested in pharmacologic options, discussed potential side effects and appropriate dosing and agreed to trial Varenicline. Reviewed lifestyle methods to manage cravings and reduce intake.  Orders:    Varenicline Tartrate, Starter, (Chantix Starting Month Devyn) 0.5 MG X 11 & 1 MG X 42 TBPK; 0.5 mg once daily for 3 days then 0.5mg twice daily for days 4-7 then 1 mg twice daily    Palpitations  Pt notes chronic palpitations in absence of triggers, pain, or SOB. They happen daily and self resolve after a few seconds. He had a 48h Holter monitor in  which was normal. He declines repeat workup today, will add CBC to his outstanding bloodwork to assess for anemia.  Orders:    CBC and differential; Future    Erectile dysfunction, unspecified erectile dysfunction type  Sildenafil 100mg PRN taken qHS working well with no reported side effects. Return in 6 months for follow up.             Depression Screening and Follow-up Plan: Patient was screened for depression during today's encounter. They screened negative with a PHQ-2 score of 0.        History of Present Illness   HPI  Pt presents for regular follow up.  He take sildenafil, not cialis. /79, no cardiac symptoms out of the ordinary. Using daily, works well, no side effects.  Palpitations. Happen randomly every day and self-resolve without respiratory  "issues or pain. No anxiety or depression. He has  had a holter monitor in the past, but did not experience his symptoms with it on and findings were normal. Declines additional workup today.  Coughed up some dark stuff for 1wk. Pain in throat at this time. Now resolved.  Smokes 1.5-2 packs cigarettes daily. Patches and gum did not result in abstinence. He is interested in quitting with pharmacologic support.  Drinks alcohol rarely. 12 beers a month.    Review of Systems negative except as noted above    Objective   /79 (BP Location: Left arm, Patient Position: Sitting, Cuff Size: Large)   Pulse 84   Temp 98 °F (36.7 °C) (Temporal)   Ht 5' 11\" (1.803 m)   Wt 70.3 kg (155 lb)   SpO2 98%   BMI 21.62 kg/m²      Physical Exam  Vitals and nursing note reviewed.   Constitutional:       General: He is not in acute distress.     Appearance: He is well-developed.   HENT:      Head: Normocephalic and atraumatic.     Eyes:      Conjunctiva/sclera: Conjunctivae normal.       Cardiovascular:      Rate and Rhythm: Normal rate and regular rhythm.      Heart sounds: No murmur heard.  Pulmonary:      Effort: Pulmonary effort is normal. No respiratory distress.      Comments: Coarse air sounds lower lobes with good air movement throughout  Abdominal:      General: Abdomen is flat. There is no distension.     Musculoskeletal:         General: No swelling.      Cervical back: Neck supple.      Comments: Varicose veins BL LE     Skin:     General: Skin is warm and dry.      Capillary Refill: Capillary refill takes less than 2 seconds.     Neurological:      Mental Status: He is alert.     Psychiatric:         Mood and Affect: Mood normal.       Administrative Statements   I have spent a total time of 20 minutes in caring for this patient on the day of the visit/encounter including Impressions, Counseling / Coordination of care, Documenting in the medical record, Reviewing/placing orders in the medical record (including tests, " medications, and/or procedures), Obtaining or reviewing history  , and Communicating with other healthcare professionals .

## 2025-06-06 NOTE — ASSESSMENT & PLAN NOTE
Sildenafil 100mg PRN taken qHS working well with no reported side effects. Return in 6 months for follow up.

## 2025-06-06 NOTE — ASSESSMENT & PLAN NOTE
Smoking 1.5-2 packs of cigarettes daily and would like to quit. Did not experience benefit from patches or lozenges. Interested in pharmacologic options, discussed potential side effects and appropriate dosing and agreed to trial Varenicline. Reviewed lifestyle methods to manage cravings and reduce intake.  Orders:    Varenicline Tartrate, Starter, (Chantix Starting Month Pak) 0.5 MG X 11 & 1 MG X 42 TBPK; 0.5 mg once daily for 3 days then 0.5mg twice daily for days 4-7 then 1 mg twice daily

## 2025-06-06 NOTE — ASSESSMENT & PLAN NOTE
Pt notes chronic palpitations in absence of triggers, pain, or SOB. They happen daily and self resolve after a few seconds. He had a 48h Holter monitor in 2022 which was normal. He declines repeat workup today, will add CBC to his outstanding bloodwork to assess for anemia.  Orders:    CBC and differential; Future

## 2025-06-13 ENCOUNTER — TELEPHONE (OUTPATIENT)
Dept: FAMILY MEDICINE CLINIC | Facility: CLINIC | Age: 50
End: 2025-06-13

## 2025-06-13 NOTE — TELEPHONE ENCOUNTER
Called pt and he clarified he wanted to find out his blood type. Discussed with his PCP and the advice is that he can try donate blood and they will tell him his blood type.

## 2025-06-25 DIAGNOSIS — N52.9 ERECTILE DYSFUNCTION, UNSPECIFIED ERECTILE DYSFUNCTION TYPE: ICD-10-CM

## 2025-06-25 RX ORDER — SILDENAFIL 100 MG/1
100 TABLET, FILM COATED ORAL AS NEEDED
Qty: 30 TABLET | Refills: 5 | Status: SHIPPED | OUTPATIENT
Start: 2025-06-25

## 2025-07-07 ENCOUNTER — PATIENT MESSAGE (OUTPATIENT)
Dept: FAMILY MEDICINE CLINIC | Facility: CLINIC | Age: 50
End: 2025-07-07

## 2025-07-07 DIAGNOSIS — F17.200 TOBACCO DEPENDENCE: ICD-10-CM

## 2025-07-07 DIAGNOSIS — R00.2 PALPITATIONS: Primary | ICD-10-CM

## 2025-07-08 ENCOUNTER — TELEPHONE (OUTPATIENT)
Dept: FAMILY MEDICINE CLINIC | Facility: CLINIC | Age: 50
End: 2025-07-08

## 2025-07-08 NOTE — TELEPHONE ENCOUNTER
I've called to schedule an appointment. Pt is not available due to classes. His Class start at 8am and end at 5pm. He asked if the dr has any other suggestions.

## 2025-07-17 ENCOUNTER — HOSPITAL ENCOUNTER (OUTPATIENT)
Dept: NON INVASIVE DIAGNOSTICS | Facility: HOSPITAL | Age: 50
Discharge: HOME | End: 2025-07-17

## 2025-07-17 DIAGNOSIS — F17.200 TOBACCO DEPENDENCE: ICD-10-CM

## 2025-07-17 DIAGNOSIS — R00.2 PALPITATIONS: ICD-10-CM

## 2025-07-17 PROCEDURE — 93226 XTRNL ECG REC<48 HR SCAN A/R: CPT

## 2025-07-17 PROCEDURE — 93225 XTRNL ECG REC<48 HRS REC: CPT

## 2025-07-28 PROCEDURE — 93227 XTRNL ECG REC<48 HR R&I: CPT | Performed by: INTERNAL MEDICINE
